# Patient Record
Sex: FEMALE | Race: WHITE | NOT HISPANIC OR LATINO | Employment: FULL TIME | URBAN - METROPOLITAN AREA
[De-identification: names, ages, dates, MRNs, and addresses within clinical notes are randomized per-mention and may not be internally consistent; named-entity substitution may affect disease eponyms.]

---

## 2023-04-07 LAB
ESTIMATED AVERAGE GLUCOSE FOR HBA1C: 108 MG/DL
HEMOGLOBIN A1C/HEMOGLOBIN TOTAL IN BLOOD: 5.4 %
THYROPEROXIDASE AB (IU/ML) IN SER/PLAS: <28 IU/ML
THYROTROPIN (MIU/L) IN SER/PLAS BY DETECTION LIMIT <= 0.05 MIU/L: 1.63 MIU/L (ref 0.44–3.98)
THYROXINE (T4) FREE (NG/DL) IN SER/PLAS: 1.01 NG/DL (ref 0.78–1.48)

## 2023-04-11 LAB — CORTISOL (UG/DL) IN SERUM - AM: 0.7 UG/DL (ref 5–20)

## 2023-04-14 LAB — ADRENOCORTICOTROPIC HORMONE: 3.4 PG/ML (ref 7.2–63.3)

## 2023-04-20 LAB — DEXAMETHASONE: 314 NG/DL

## 2023-10-02 ENCOUNTER — ANCILLARY PROCEDURE (OUTPATIENT)
Dept: RADIOLOGY | Facility: CLINIC | Age: 38
End: 2023-10-02
Payer: COMMERCIAL

## 2023-10-02 DIAGNOSIS — E04.1 NONTOXIC SINGLE THYROID NODULE: ICD-10-CM

## 2023-10-02 PROCEDURE — 76536 US EXAM OF HEAD AND NECK: CPT

## 2023-10-02 PROCEDURE — 76536 US EXAM OF HEAD AND NECK: CPT | Performed by: STUDENT IN AN ORGANIZED HEALTH CARE EDUCATION/TRAINING PROGRAM

## 2023-10-09 ENCOUNTER — APPOINTMENT (OUTPATIENT)
Dept: PRIMARY CARE | Facility: CLINIC | Age: 38
End: 2023-10-09

## 2023-10-09 RX ORDER — DEXAMETHASONE 1 MG/1
TABLET ORAL
COMMUNITY
Start: 2023-04-10 | End: 2023-10-10 | Stop reason: ALTCHOICE

## 2023-10-09 RX ORDER — NORETHINDRONE ACETATE AND ETHINYL ESTRADIOL AND FERROUS FUMARATE 1MG-20(21)
1 KIT ORAL DAILY
COMMUNITY
Start: 2023-02-02 | End: 2023-10-10 | Stop reason: ALTCHOICE

## 2023-10-09 RX ORDER — METFORMIN HYDROCHLORIDE 500 MG/1
TABLET ORAL
COMMUNITY
End: 2023-11-03 | Stop reason: SINTOL

## 2023-10-09 RX ORDER — LABETALOL 100 MG/1
TABLET, FILM COATED ORAL
COMMUNITY
End: 2023-10-10 | Stop reason: SDUPTHER

## 2023-10-09 RX ORDER — SERTRALINE HYDROCHLORIDE 100 MG/1
100 TABLET, FILM COATED ORAL DAILY
COMMUNITY
End: 2023-10-10 | Stop reason: ALTCHOICE

## 2023-10-09 RX ORDER — IBUPROFEN 600 MG/1
TABLET ORAL
COMMUNITY
End: 2023-10-10 | Stop reason: ALTCHOICE

## 2023-10-09 RX ORDER — LISINOPRIL 5 MG/1
TABLET ORAL EVERY 24 HOURS
COMMUNITY
End: 2023-10-10 | Stop reason: SDUPTHER

## 2023-10-09 RX ORDER — ASPIRIN 325 MG
1 TABLET, DELAYED RELEASE (ENTERIC COATED) ORAL WEEKLY
COMMUNITY
Start: 2017-05-15 | End: 2023-10-10 | Stop reason: ALTCHOICE

## 2023-10-09 RX ORDER — METFORMIN HYDROCHLORIDE 1000 MG/1
1000 TABLET ORAL
COMMUNITY
End: 2023-10-10 | Stop reason: ALTCHOICE

## 2023-10-09 RX ORDER — OMEPRAZOLE 20 MG/1
TABLET, DELAYED RELEASE ORAL
COMMUNITY

## 2023-10-09 RX ORDER — FAMOTIDINE 20 MG/1
TABLET, FILM COATED ORAL EVERY 24 HOURS
COMMUNITY
Start: 2022-03-07 | End: 2023-10-10 | Stop reason: ALTCHOICE

## 2023-10-09 RX ORDER — LISINOPRIL 20 MG/1
TABLET ORAL EVERY 24 HOURS
COMMUNITY
End: 2023-10-10 | Stop reason: ALTCHOICE

## 2023-10-09 RX ORDER — LABETALOL 200 MG/1
1 TABLET, FILM COATED ORAL EVERY 12 HOURS
COMMUNITY
Start: 2019-12-04 | End: 2023-11-03 | Stop reason: ALTCHOICE

## 2023-10-09 RX ORDER — LORATADINE 10 MG/1
TABLET ORAL EVERY 24 HOURS
COMMUNITY
End: 2023-11-03 | Stop reason: ALTCHOICE

## 2023-10-10 ENCOUNTER — OFFICE VISIT (OUTPATIENT)
Dept: PRIMARY CARE | Facility: CLINIC | Age: 38
End: 2023-10-10
Payer: COMMERCIAL

## 2023-10-10 VITALS
HEIGHT: 64 IN | BODY MASS INDEX: 50.02 KG/M2 | TEMPERATURE: 97.4 F | SYSTOLIC BLOOD PRESSURE: 138 MMHG | WEIGHT: 293 LBS | DIASTOLIC BLOOD PRESSURE: 90 MMHG

## 2023-10-10 DIAGNOSIS — I10 ESSENTIAL HYPERTENSION: Primary | ICD-10-CM

## 2023-10-10 DIAGNOSIS — E88.819 INSULIN RESISTANCE: ICD-10-CM

## 2023-10-10 DIAGNOSIS — F41.9 ANXIETY: ICD-10-CM

## 2023-10-10 DIAGNOSIS — E26.1 SECONDARY HYPERALDOSTERONISM (MULTI): ICD-10-CM

## 2023-10-10 DIAGNOSIS — E66.01 MORBID (SEVERE) OBESITY DUE TO EXCESS CALORIES (MULTI): ICD-10-CM

## 2023-10-10 DIAGNOSIS — F32.5 MAJOR DEPRESSIVE DISORDER WITH SINGLE EPISODE, IN FULL REMISSION (CMS-HCC): ICD-10-CM

## 2023-10-10 PROBLEM — I73.00 RAYNAUDS SYNDROME: Status: ACTIVE | Noted: 2020-08-09

## 2023-10-10 PROBLEM — Z90.09 HISTORY OF LOBECTOMY OF THYROID: Status: ACTIVE | Noted: 2023-10-10

## 2023-10-10 PROBLEM — E55.9 VITAMIN D DEFICIENCY: Status: ACTIVE | Noted: 2023-10-10

## 2023-10-10 PROBLEM — K59.00 CONSTIPATION: Status: ACTIVE | Noted: 2023-10-10

## 2023-10-10 PROBLEM — R53.83 FATIGUE: Status: ACTIVE | Noted: 2020-08-07

## 2023-10-10 PROBLEM — R41.840 POOR CONCENTRATION: Status: ACTIVE | Noted: 2021-02-10

## 2023-10-10 PROBLEM — E89.0 POSTOPERATIVE HYPOTHYROIDISM: Status: ACTIVE | Noted: 2023-10-10

## 2023-10-10 PROBLEM — K21.00 GERD WITH ESOPHAGITIS: Status: ACTIVE | Noted: 2018-07-16

## 2023-10-10 PROBLEM — H93.13 BILATERAL TINNITUS: Status: ACTIVE | Noted: 2020-11-05

## 2023-10-10 PROBLEM — E04.1 THYROID NODULE: Status: ACTIVE | Noted: 2023-10-10

## 2023-10-10 PROBLEM — M21.852 HIP DYSPLASIA, ACQUIRED, LEFT: Status: ACTIVE | Noted: 2023-10-10

## 2023-10-10 PROBLEM — R40.0 DAYTIME SLEEPINESS: Status: ACTIVE | Noted: 2023-10-10

## 2023-10-10 PROBLEM — M54.12 CERVICAL RADICULOPATHY: Status: ACTIVE | Noted: 2021-02-17

## 2023-10-10 PROBLEM — G44.86 CERVICOGENIC HEADACHE: Status: ACTIVE | Noted: 2021-02-17

## 2023-10-10 PROBLEM — R76.0 ELEVATED ANTISTREPTOLYSIN O TITER: Status: ACTIVE | Noted: 2021-02-18

## 2023-10-10 PROBLEM — L70.9 ACNE: Status: ACTIVE | Noted: 2023-10-10

## 2023-10-10 PROCEDURE — 1036F TOBACCO NON-USER: CPT | Performed by: PHYSICIAN ASSISTANT

## 2023-10-10 PROCEDURE — 99204 OFFICE O/P NEW MOD 45 MIN: CPT | Performed by: PHYSICIAN ASSISTANT

## 2023-10-10 PROCEDURE — 3080F DIAST BP >= 90 MM HG: CPT | Performed by: PHYSICIAN ASSISTANT

## 2023-10-10 PROCEDURE — 90471 IMMUNIZATION ADMIN: CPT | Performed by: PHYSICIAN ASSISTANT

## 2023-10-10 PROCEDURE — 3008F BODY MASS INDEX DOCD: CPT | Performed by: PHYSICIAN ASSISTANT

## 2023-10-10 PROCEDURE — 90682 RIV4 VACC RECOMBINANT DNA IM: CPT | Performed by: PHYSICIAN ASSISTANT

## 2023-10-10 PROCEDURE — 3075F SYST BP GE 130 - 139MM HG: CPT | Performed by: PHYSICIAN ASSISTANT

## 2023-10-10 RX ORDER — SERTRALINE HYDROCHLORIDE 50 MG/1
50 TABLET, FILM COATED ORAL DAILY
Qty: 30 TABLET | Refills: 3 | Status: SHIPPED | OUTPATIENT
Start: 2023-10-10 | End: 2024-03-04

## 2023-10-10 RX ORDER — LISINOPRIL 5 MG/1
5 TABLET ORAL DAILY
Qty: 30 TABLET | Refills: 3 | Status: SHIPPED | OUTPATIENT
Start: 2023-10-10 | End: 2023-10-30

## 2023-10-10 RX ORDER — LABETALOL 100 MG/1
TABLET, FILM COATED ORAL
Qty: 45 TABLET | Refills: 3 | Status: SHIPPED | OUTPATIENT
Start: 2023-10-10 | End: 2023-11-03 | Stop reason: ALTCHOICE

## 2023-10-10 ASSESSMENT — COLUMBIA-SUICIDE SEVERITY RATING SCALE - C-SSRS
6. HAVE YOU EVER DONE ANYTHING, STARTED TO DO ANYTHING, OR PREPARED TO DO ANYTHING TO END YOUR LIFE?: NO
1. IN THE PAST MONTH, HAVE YOU WISHED YOU WERE DEAD OR WISHED YOU COULD GO TO SLEEP AND NOT WAKE UP?: NO
6. HAVE YOU EVER DONE ANYTHING, STARTED TO DO ANYTHING, OR PREPARED TO DO ANYTHING TO END YOUR LIFE?: NO
1. IN THE PAST MONTH, HAVE YOU WISHED YOU WERE DEAD OR WISHED YOU COULD GO TO SLEEP AND NOT WAKE UP?: NO
2. HAVE YOU ACTUALLY HAD ANY THOUGHTS OF KILLING YOURSELF?: NO
2. HAVE YOU ACTUALLY HAD ANY THOUGHTS OF KILLING YOURSELF?: NO

## 2023-10-10 ASSESSMENT — PATIENT HEALTH QUESTIONNAIRE - PHQ9
SUM OF ALL RESPONSES TO PHQ9 QUESTIONS 1 AND 2: 2
SUM OF ALL RESPONSES TO PHQ9 QUESTIONS 1 AND 2: 2
1. LITTLE INTEREST OR PLEASURE IN DOING THINGS: SEVERAL DAYS
2. FEELING DOWN, DEPRESSED OR HOPELESS: SEVERAL DAYS
10. IF YOU CHECKED OFF ANY PROBLEMS, HOW DIFFICULT HAVE THESE PROBLEMS MADE IT FOR YOU TO DO YOUR WORK, TAKE CARE OF THINGS AT HOME, OR GET ALONG WITH OTHER PEOPLE: SOMEWHAT DIFFICULT
1. LITTLE INTEREST OR PLEASURE IN DOING THINGS: SEVERAL DAYS
10. IF YOU CHECKED OFF ANY PROBLEMS, HOW DIFFICULT HAVE THESE PROBLEMS MADE IT FOR YOU TO DO YOUR WORK, TAKE CARE OF THINGS AT HOME, OR GET ALONG WITH OTHER PEOPLE: SOMEWHAT DIFFICULT
2. FEELING DOWN, DEPRESSED OR HOPELESS: SEVERAL DAYS

## 2023-10-10 ASSESSMENT — ENCOUNTER SYMPTOMS
CHOKING: 0
SLEEP DISTURBANCE: 0
ABDOMINAL PAIN: 0
POLYDIPSIA: 0
FATIGUE: 0
JOINT SWELLING: 0
DIFFICULTY URINATING: 0
ANAL BLEEDING: 0
PALPITATIONS: 0
FEVER: 0
SHORTNESS OF BREATH: 0
WEAKNESS: 0
NUMBNESS: 0
WHEEZING: 0
POLYPHAGIA: 0
EYE DISCHARGE: 0
CHILLS: 0
FREQUENCY: 0
COLOR CHANGE: 0
HEADACHES: 0
CONSTIPATION: 0
ABDOMINAL DISTENTION: 0
TREMORS: 0
OCCASIONAL FEELINGS OF UNSTEADINESS: 0
CHEST TIGHTNESS: 0
NERVOUS/ANXIOUS: 0
DIZZINESS: 0
ARTHRALGIAS: 0
VOMITING: 0
DIARRHEA: 0
APPETITE CHANGE: 0
LOSS OF SENSATION IN FEET: 0
COUGH: 0
NAUSEA: 0
CONFUSION: 0
EYE PAIN: 0
MYALGIAS: 0
HEMATURIA: 0
SORE THROAT: 0
DEPRESSION: 1
FACIAL SWELLING: 0

## 2023-10-10 NOTE — PROGRESS NOTES
Subjective   Patient ID: Tamra Tai is a 37 y.o. female with a history of obesity, JENNY, hypertension, thyroid nodule, chronic fatigue, Raynaud's phenomenon, diabetes type 2, tinnitus B/L, thyroid nodule s/p hemithyroidectomy in 2016, GERD, depression, and vitamin D deficiency who presents for Establish Care and Flu Vaccine.    HPI the patient is presented to become established as a new patient with a primary care provider.  Stated she is trying to taper her labetalol which was given during pregnancy 2 years ago.  She used to be on lisinopril.  She is also tapering sertraline stating that she felt like zombie and since she is on 75 mg she feels much better.  Stated her personality is back.  It is easier to talk to people and feeling like herself.  The patient has hard time losing weight.  She gained 60 pounds in 2 years.  States she started gaining weight after she was started on sertraline. She currently goes to endocrinology and was started on metformin for insulin resistance; however, she is not losing any weight.  She is also on weight watchers for a year without any improvement.  She still feels tired even with CPAP which she got a few weeks ago.      Review of Systems   Constitutional:  Negative for appetite change, chills, fatigue and fever.   HENT:  Negative for congestion, ear pain, facial swelling, hearing loss, nosebleeds and sore throat.    Eyes:  Negative for pain, discharge and visual disturbance.   Respiratory:  Negative for cough, choking, chest tightness, shortness of breath and wheezing.    Cardiovascular:  Negative for chest pain, palpitations and leg swelling.   Gastrointestinal:  Negative for abdominal distention, abdominal pain, anal bleeding, constipation, diarrhea, nausea and vomiting.   Endocrine: Negative for cold intolerance, heat intolerance, polydipsia, polyphagia and polyuria.   Genitourinary:  Negative for difficulty urinating, frequency, hematuria and urgency.  "  Musculoskeletal:  Negative for arthralgias, gait problem, joint swelling and myalgias.   Skin:  Negative for color change and rash.   Neurological:  Negative for dizziness, tremors, syncope, weakness, numbness and headaches.   Psychiatric/Behavioral:  Negative for behavioral problems, confusion, sleep disturbance and suicidal ideas. The patient is not nervous/anxious.        Objective   /90   Temp 36.3 °C (97.4 °F)   Ht 1.626 m (5' 4\")   Wt 133 kg (293 lb)   LMP 09/17/2023   Breastfeeding No   BMI 50.29 kg/m²     Physical Exam  Constitutional:       General: She is not in acute distress.     Appearance: Normal appearance.   HENT:      Head: Normocephalic and atraumatic.      Nose: Nose normal.   Eyes:      Extraocular Movements: Extraocular movements intact.      Conjunctiva/sclera: Conjunctivae normal.      Pupils: Pupils are equal, round, and reactive to light.   Cardiovascular:      Rate and Rhythm: Normal rate and regular rhythm.      Pulses: Normal pulses.      Heart sounds: Normal heart sounds.   Pulmonary:      Effort: Pulmonary effort is normal.      Breath sounds: Normal breath sounds.   Abdominal:      General: Bowel sounds are normal.      Palpations: Abdomen is soft.   Musculoskeletal:         General: Normal range of motion.      Cervical back: Normal range of motion and neck supple.   Neurological:      General: No focal deficit present.      Mental Status: She is alert and oriented to person, place, and time.   Psychiatric:         Mood and Affect: Mood normal.         Behavior: Behavior normal.         Thought Content: Thought content normal.         Judgment: Judgment normal.         Assessment/Plan       BMI 50.29 kg/m²  Gained 60 pounds in 2 years  Currently on weight watchers since July 2022  On metformin 500 mg twice daily  Unable to lose weight  Could be due to sertraline  Currently tapering  Continue metformin 500 mg twice daily  Low fat, low cholesterol diet, low carbohydrate " diet  Exercise at least 30 minutes daily    High blood pressure   Tapering labetalol  She is currently on labetalol 200 mg a.m. and 100 mg p.m.  We will taper to 100 mg in the morning and 50 mg in the evening  Start lisinopril 5 mg once daily  Monitor blood pressure daily  If above 140 / 90 mmHg, we will increase lisinopril  No added salt diet, do not add salt to your food  Try to exercise every day for 30 minutes    Insulin resistance  Continue Metformin 500 mg twice daily  Follow-up with endocrinology Dr. Aguilar     Depression  Stable  Tapering sertraline 100 mg   Currently on 75 mg  Decrease to 50 mg if tolerable  If not, go back to 75 mg for another couple of weeks  Doing better    Acid reflux  Avoid caffeine and alcoholic beverages  Avoid spicy and acidic food, such as tomato and citrus fruits  Avoid onions, peppermint and spearmint   Eat small, frequent meals  Do not eat late at night, at least 3 hours before bed  Raise the head of the bed 6-8 inches for sleeping  Wear lose-fitting clothes around your waist   Continue Omeprazole 20 mg before meal     JENNY  Home sleep study June 2023  On CPAP  Follow-up with sleep medicine    Vaccine given today    Follow-up in 2 weeks

## 2023-10-25 DIAGNOSIS — F41.9 ANXIETY: Primary | ICD-10-CM

## 2023-10-26 PROBLEM — M25.859 FEMORAL ACETABULAR IMPINGEMENT: Status: ACTIVE | Noted: 2023-10-26

## 2023-10-26 PROBLEM — R63.5 ABNORMAL WEIGHT GAIN: Status: ACTIVE | Noted: 2023-10-26

## 2023-10-26 PROBLEM — G47.33 OBSTRUCTIVE SLEEP APNEA OF ADULT: Status: ACTIVE | Noted: 2023-10-26

## 2023-10-26 RX ORDER — SERTRALINE HYDROCHLORIDE 100 MG/1
100 TABLET, FILM COATED ORAL DAILY
Qty: 90 TABLET | Refills: 1 | Status: SHIPPED | OUTPATIENT
Start: 2023-10-26 | End: 2023-11-03 | Stop reason: ALTCHOICE

## 2023-10-27 ENCOUNTER — OFFICE VISIT (OUTPATIENT)
Dept: OPHTHALMOLOGY | Facility: CLINIC | Age: 38
End: 2023-10-27
Payer: COMMERCIAL

## 2023-10-27 DIAGNOSIS — H52.203 MYOPIA OF BOTH EYES WITH ASTIGMATISM: Primary | ICD-10-CM

## 2023-10-27 DIAGNOSIS — H52.13 MYOPIA OF BOTH EYES WITH ASTIGMATISM: Primary | ICD-10-CM

## 2023-10-27 DIAGNOSIS — Z46.0 CONTACT LENS/GLASSES FITTING: ICD-10-CM

## 2023-10-27 PROCEDURE — FLVLF CONTACT LENS EVALUATION (SP): Performed by: OPTOMETRIST

## 2023-10-27 PROCEDURE — 92015 DETERMINE REFRACTIVE STATE: CPT | Performed by: OPTOMETRIST

## 2023-10-27 PROCEDURE — 92004 COMPRE OPH EXAM NEW PT 1/>: CPT | Performed by: OPTOMETRIST

## 2023-10-27 ASSESSMENT — CONF VISUAL FIELD
OS_SUPERIOR_NASAL_RESTRICTION: 0
OD_SUPERIOR_NASAL_RESTRICTION: 0
OS_SUPERIOR_TEMPORAL_RESTRICTION: 0
OD_NORMAL: 1
OS_NORMAL: 1
OD_SUPERIOR_TEMPORAL_RESTRICTION: 0
METHOD: COUNTING FINGERS
OD_INFERIOR_TEMPORAL_RESTRICTION: 0
OD_INFERIOR_NASAL_RESTRICTION: 0
OS_INFERIOR_NASAL_RESTRICTION: 0
OS_INFERIOR_TEMPORAL_RESTRICTION: 0

## 2023-10-27 ASSESSMENT — REFRACTION_WEARINGRX
OD_CYLINDER: -1.50
OS_SPHERE: -1.50
OD_AXIS: 085
OD_SPHERE: -1.50
OS_AXIS: 090
OS_CYLINDER: -1.75

## 2023-10-27 ASSESSMENT — KERATOMETRY
OS_AXISANGLE2_DEGREES: 171
OD_AXISANGLE2_DEGREES: 143
OS_K1POWER_DIOPTERS: 46.50
OS_K2POWER_DIOPTERS: 45.50
OS_AXISANGLE_DEGREES: 081
OD_AXISANGLE_DEGREES: 053
OD_K1POWER_DIOPTERS: 46.00
OD_K2POWER_DIOPTERS: 46.75

## 2023-10-27 ASSESSMENT — ENCOUNTER SYMPTOMS
EYES NEGATIVE: 0
GASTROINTESTINAL NEGATIVE: 0
ALLERGIC/IMMUNOLOGIC NEGATIVE: 0
CARDIOVASCULAR NEGATIVE: 0
PSYCHIATRIC NEGATIVE: 0
NEUROLOGICAL NEGATIVE: 0
HEMATOLOGIC/LYMPHATIC NEGATIVE: 0
MUSCULOSKELETAL NEGATIVE: 0
CONSTITUTIONAL NEGATIVE: 0
ENDOCRINE NEGATIVE: 0
RESPIRATORY NEGATIVE: 0

## 2023-10-27 ASSESSMENT — EXTERNAL EXAM - LEFT EYE: OS_EXAM: NORMAL

## 2023-10-27 ASSESSMENT — REFRACTION_MANIFEST
OS_CYLINDER: -1.75
OS_CYLINDER: -2.00
OD_SPHERE: -2.75
OD_SPHERE: -2.00
OD_CYLINDER: -1.25
OS_SPHERE: -1.75
OS_AXIS: 083
OS_AXIS: 090
OD_CYLINDER: -0.50
METHOD_AUTOREFRACTION: 1
OD_AXIS: 095
OS_SPHERE: -1.50
OD_AXIS: 119

## 2023-10-27 ASSESSMENT — SLIT LAMP EXAM - LIDS
COMMENTS: NORMAL
COMMENTS: NORMAL

## 2023-10-27 ASSESSMENT — VISUAL ACUITY
OS_CC: 20/20
METHOD: SNELLEN - LINEAR
CORRECTION_TYPE: GLASSES
OS_CC: 20/20
OS_CC+: -1
OD_CC: 20/25
VA_OR_OD_CURRENT_RX: 20/20
VA_OR_OS_CURRENT_RX: 20/20
OD_CC: 20/20

## 2023-10-27 ASSESSMENT — REFRACTION_CURRENTRX
OD_BASECURVE: 8.5
OS_BASECURVE: 8.5
OS_DIAMETER: 14.5
OD_AXIS: 090
OS_BRAND: ACUVUE 1 DAY MOIST FOR ASTIGMATISM
OD_BRAND: ACUVUE 1 DAY MOIST FOR ASTIGMATISM
OS_AXIS: 090
OD_DIAMETER: 14.5
OD_CYLINDER: -1.25
OD_SPHERE: -2.00
OS_SPHERE: -1.00
OS_CYLINDER: -1.75

## 2023-10-27 ASSESSMENT — EXTERNAL EXAM - RIGHT EYE: OD_EXAM: NORMAL

## 2023-10-27 ASSESSMENT — TONOMETRY
OD_IOP_MMHG: 18
IOP_METHOD: GOLDMANN APPLANATION
OS_IOP_MMHG: 18

## 2023-10-27 NOTE — PROGRESS NOTES
Assessment/Plan   Diagnoses and all orders for this visit:  Myopia of both eyes with astigmatism  Patient educated on findings. Updated spectacle Rx dispensed today. Monitor yearly.   Contact lens/glasses fitting  Previous wearer of monthly contacts. Successful fit in daily lenses today. Trials sent home with patient. Patient educated on contact lens hygiene and daily replacement schedule. Patient instructed to RTC immediately if experiencing redness, pain, or blurry vision in contacts. Patient will call to finalize prescription. Monitor yearly.    First exam, optic nerves appear slightly elevated although this could be physiologic vs drusen vs early papilledema. Visual acuity (VA) is excellent, although patient does have a history of headaches which have worsened in the past few years. Had an MRI 6 years ago which was non-contributory per patient's recollection.    Refer to Dr Mckenzie for for possible optic nerve swelling and IIH consult.

## 2023-10-30 DIAGNOSIS — I10 ESSENTIAL HYPERTENSION: Primary | ICD-10-CM

## 2023-10-30 RX ORDER — LISINOPRIL 10 MG/1
10 TABLET ORAL DAILY
Qty: 30 TABLET | Refills: 5 | Status: SHIPPED | OUTPATIENT
Start: 2023-10-30 | End: 2023-11-13

## 2023-11-03 ENCOUNTER — DOCUMENTATION (OUTPATIENT)
Dept: OPHTHALMOLOGY | Facility: CLINIC | Age: 38
End: 2023-11-03

## 2023-11-03 ENCOUNTER — OFFICE VISIT (OUTPATIENT)
Dept: ENDOCRINOLOGY | Facility: CLINIC | Age: 38
End: 2023-11-03
Payer: COMMERCIAL

## 2023-11-03 VITALS
SYSTOLIC BLOOD PRESSURE: 136 MMHG | DIASTOLIC BLOOD PRESSURE: 93 MMHG | BODY MASS INDEX: 49.83 KG/M2 | HEIGHT: 64 IN | HEART RATE: 96 BPM | WEIGHT: 291.9 LBS

## 2023-11-03 DIAGNOSIS — R63.5 ABNORMAL WEIGHT GAIN: ICD-10-CM

## 2023-11-03 DIAGNOSIS — H52.13 MYOPIA OF BOTH EYES WITH ASTIGMATISM: Primary | ICD-10-CM

## 2023-11-03 DIAGNOSIS — E04.1 THYROID NODULE: ICD-10-CM

## 2023-11-03 DIAGNOSIS — Z90.09 HISTORY OF LOBECTOMY OF THYROID: ICD-10-CM

## 2023-11-03 DIAGNOSIS — E88.819 INSULIN RESISTANCE: Primary | ICD-10-CM

## 2023-11-03 DIAGNOSIS — H52.203 MYOPIA OF BOTH EYES WITH ASTIGMATISM: Primary | ICD-10-CM

## 2023-11-03 PROCEDURE — 3080F DIAST BP >= 90 MM HG: CPT | Performed by: INTERNAL MEDICINE

## 2023-11-03 PROCEDURE — 3075F SYST BP GE 130 - 139MM HG: CPT | Performed by: INTERNAL MEDICINE

## 2023-11-03 PROCEDURE — 3008F BODY MASS INDEX DOCD: CPT | Performed by: INTERNAL MEDICINE

## 2023-11-03 PROCEDURE — 1036F TOBACCO NON-USER: CPT | Performed by: INTERNAL MEDICINE

## 2023-11-03 PROCEDURE — 99215 OFFICE O/P EST HI 40 MIN: CPT | Performed by: INTERNAL MEDICINE

## 2023-11-03 RX ORDER — ACETAMINOPHEN 160 MG
1 TABLET,CHEWABLE ORAL AS NEEDED
COMMUNITY

## 2023-11-03 RX ORDER — METFORMIN HYDROCHLORIDE 500 MG/1
1000 TABLET, EXTENDED RELEASE ORAL
Qty: 120 TABLET | Refills: 11 | Status: SHIPPED | OUTPATIENT
Start: 2023-11-03 | End: 2024-03-11 | Stop reason: WASHOUT

## 2023-11-03 ASSESSMENT — REFRACTION_CURRENTRX
OD_BRAND: ACUVUE 1 DAY MOIST FOR ASTIGMATISM
OS_AXIS: 090
OD_AXIS: 090
OD_SPHERE: -2.00
OD_DIAMETER: 14.5
OS_BASECURVE: 8.5
OS_CYLINDER: -1.75
OS_SPHERE: -1.50
OD_CYLINDER: -1.25
OD_BASECURVE: 8.5
OS_DIAMETER: 14.5
OS_BRAND: ACUVUE 1 DAY MOIST FOR ASTIGMATISM

## 2023-11-03 NOTE — PROGRESS NOTES
"Subjective   Tamra Tai is a 38 y.o. female who presents to endocrinology clinic for follow-up of thyroid nodules s/p L hemithyroidectomy, and fatigue & weight gain.  Last visit: 6/2/23    ENDOCRINE Hx:  s/p left hemithyroidectomy for thyroid nodule after two indeterminate biopsies in June 2016 at [facility] (Dr. Thomson). Pathology reportedly benign. Seen by me in April 2023 for recent rapid weight gain (40-50lbs), fatigue, suspected JENNY, BMI 48. TFTs/TPO & A1c wnl. 1mg DST normal as well.   US thyroid Oct 2022:  RIGHT LOBE: 5.8 x 1.9 x 1.9 cm, similar in size compared to the previous study. Mild heterogeneous appearance of the thyroid parenchyma on the right. 4 x 6 x 6 mm hypoechoic solid well-defined wider than tall nodule without microcalcifications (TR4)  LEFT LOBE: Status post left lobectomy.    INTERVAL Hx:   Diagnosed with JENNY, using CPAP every night but still feels like she wants to sleep all the time, still very tired. Toddler is sleeping through the night. Sensitive to the heat, sweats easily. Has gained about 12 lbs since summer.  Constipated.  Losing a lot of hair.      Started on metformin in April 2023, dose increased in June 2023 but did not tolerate due to diarrhea, so went back down to 500mg BID.    Exercise - weighted hulu hoop, walking  Periods still regular q24 days and heavy x10 days  Diet - doing weight watchers     Repeat thyroid US done a month ago, Oct 2023:  TR4 nodule [id number], stable  TR3 nodule [id number]  TR3 nodule [id number]     PAST MEDICAL HISTORY:  Hypertension  Postpartum depression  s/p cholecystectomy 2019  s/p L irma thyroidectomy 2016  vitamin D deficiency     SOCIAL HISTORY:  Lives with 2 sons, age 13 and 2  Nurse, works for [employer], from home, reviewing cardiac caths    ROS: otherwise negative.    Objective   BP (!) 136/93 (BP Location: Right arm, Patient Position: Sitting, BP Cuff Size: Adult)   Pulse 96   Ht 1.626 m (5' 4\")   Wt 132 kg (291 lb 14.4 oz)   LMP 09/17/2023   " BMI 50.10 kg/m²     Physical Exam  alert and conversant, in no acute distress  no lid lag, no proptosis  thyroidectomy scar well healed, R lobe normal size & consistency without discrete nodule  normal work of breathing  normal DTRs  Skin warm, normal moisture; no significant acanthosis, hirsutism, or acne   Normal mood/affect       Lab Results   Component Value Date    TSH 1.63 04/07/2023    TSH 1.57 06/16/2022    FREET4 1.01 04/07/2023    LDLF 130 (H) 12/05/2019    TRIG 112 06/16/2022    HGBA1C 5.4 04/07/2023    ALT 25 06/16/2022    AST 17 06/16/2022    TESTOSTERONE <30 12/05/2019   ,  in 6/2022    Assessment/Plan   Insulin resistance & abnormal weight gain, BMI up to 50  -     metFORMIN  mg 24 hr tablet; Take 2 tablets (1,000 mg) by mouth 2 times a day with meals. Do not crush, chew, or split.  -      lifestyle recs reviewed    History of lobectomy of thyroid, thyroid nodules all subcm TR3-4  -     TFTs with next routine labs  -     thyroid US in a year     FUV 6 mo or sooner PRN

## 2023-11-03 NOTE — PROGRESS NOTES
Diagnoses and all orders for this visit:  Myopia of both eyes with astigmatism    Patient called and said left eye was blurry with the contact lens (CL) she left with, we have increased the power of the left eye (OS) contact lens (CL) and have trials at the [facility] office and patient can come in a  those trials.  Office will call patient and inform her.

## 2023-11-03 NOTE — PATIENT INSTRUCTIONS
Great to see you!  Will check thyroid levels with your next routine blood work  We'll plan to repeat the thyroid ultrasound in a year  We're try increasing the metformin again but with the extended release formulation.  Keep trying to exercise most days and minimizing processed foods.  See you back in 6 months!

## 2023-11-07 NOTE — PROGRESS NOTES
Patient called and said left eye was blurry with the contact lens (CL) she left with, we have increased the power of the left eye (OS) contact lens (CL) and have trials at the [facility] office and patient can come in a  those trials.  Office will call patient and inform her.

## 2023-11-11 ASSESSMENT — ENCOUNTER SYMPTOMS
POLYDIPSIA: 0
APPETITE CHANGE: 0
COUGH: 0
NUMBNESS: 0
JOINT SWELLING: 0
SORE THROAT: 0
SLEEP DISTURBANCE: 0
EYE DISCHARGE: 0
ABDOMINAL DISTENTION: 0
HEMATURIA: 0
NAUSEA: 0
POLYPHAGIA: 0
NERVOUS/ANXIOUS: 0
FATIGUE: 0
WHEEZING: 0
EYE PAIN: 0
PALPITATIONS: 0
DIZZINESS: 0
FACIAL SWELLING: 0
ARTHRALGIAS: 0
CHILLS: 0
CONFUSION: 0
CHEST TIGHTNESS: 0
FREQUENCY: 0
FEVER: 0
CONSTIPATION: 0
VOMITING: 0
MYALGIAS: 0
CHOKING: 0
SHORTNESS OF BREATH: 0
DIFFICULTY URINATING: 0
TREMORS: 0
COLOR CHANGE: 0
ANAL BLEEDING: 0
ABDOMINAL PAIN: 0
WEAKNESS: 0
DIARRHEA: 0
HEADACHES: 0

## 2023-11-11 NOTE — PROGRESS NOTES
Subjective   Patient ID: Tamra Tai is a 38 y.o. female with a history of obesity, JENNY, hypertension, thyroid nodule, chronic fatigue, Raynaud's phenomenon, diabetes type 2, chronic constipation, tinnitus B/L, thyroid nodule s/p hemithyroidectomy in 2016, GERD, depression, and vitamin D deficiency who presents for Annual Exam.    HPI the patient is presented for physical exam.  She tapered her labetalol and increased lisinopril to 30 mg 2 days ago which controls her blood pressure better.  She was seen by ophthalmology and was suspected to have intracranial pressure.  She was referred to neuro-ophthalmology which is scheduled on December 1.  She tapered sertraline to 50 mg and her mood is stable.  The patient does not exercise but stays active with her kids.  She is on weight watchers diet.   Today she denies shortness of breath of breast or chest pain.  There is no abdominal pain, nausea or vomiting.  No fever or chills.    Review of Systems   Constitutional:  Negative for appetite change, chills, fatigue and fever.   HENT:  Negative for congestion, ear pain, facial swelling, hearing loss, nosebleeds and sore throat.    Eyes:  Negative for pain, discharge and visual disturbance.   Respiratory:  Negative for cough, choking, chest tightness, shortness of breath and wheezing.    Cardiovascular:  Negative for chest pain, palpitations and leg swelling.   Gastrointestinal:  Negative for abdominal distention, abdominal pain, anal bleeding, constipation, diarrhea, nausea and vomiting.   Endocrine: Negative for cold intolerance, heat intolerance, polydipsia, polyphagia and polyuria.   Genitourinary:  Negative for difficulty urinating, frequency, hematuria and urgency.   Musculoskeletal:  Negative for arthralgias, gait problem, joint swelling and myalgias.   Skin:  Negative for color change and rash.   Neurological:  Negative for dizziness, tremors, syncope, weakness, numbness and headaches.   Psychiatric/Behavioral:  " Negative for behavioral problems, confusion, sleep disturbance and suicidal ideas. The patient is not nervous/anxious.        Objective   BP (!) 132/94   Temp 36.2 °C (97.1 °F) (Temporal)   Ht 1.626 m (5' 4\")   Wt 132 kg (291 lb)   BMI 49.95 kg/m²     Physical Exam  Constitutional:       General: She is not in acute distress.     Appearance: Normal appearance. She is obese.   HENT:      Head: Normocephalic and atraumatic.      Nose: Nose normal.   Eyes:      Extraocular Movements: Extraocular movements intact.      Conjunctiva/sclera: Conjunctivae normal.      Pupils: Pupils are equal, round, and reactive to light.   Cardiovascular:      Rate and Rhythm: Normal rate and regular rhythm.      Pulses: Normal pulses.      Heart sounds: Normal heart sounds.   Pulmonary:      Effort: Pulmonary effort is normal.      Breath sounds: Normal breath sounds.   Abdominal:      General: Bowel sounds are normal.      Palpations: Abdomen is soft.   Musculoskeletal:         General: Normal range of motion.      Cervical back: Normal range of motion and neck supple.   Neurological:      General: No focal deficit present.      Mental Status: She is alert and oriented to person, place, and time.   Psychiatric:         Mood and Affect: Mood normal.         Behavior: Behavior normal.         Thought Content: Thought content normal.         Judgment: Judgment normal.         Assessment/Plan   Complete physical exam.  We obtained fasting blood work including CBC, CMP, lipid panel.  Urinalysis    Exercise at least 30 minutes daily  Healthy diet recommended    Follow up with dentist twice a year for dental cleaning   Follow-up with ophthalmology in  pap smear overdue  Follow-up with gynecology    BMI 49.93 kg/m²  Gained 60 pounds in 2 years  Currently on weight watchers since July 2022  On metformin  mg twice daily  Low fat, low cholesterol diet, low carbohydrate diet  Exercise at least 30 minutes daily    High blood pressure "   Off of labetalol  Increased lisinopril to 30 mg 2 days ago  Continue lisinopril 30 mg and continue monitoring blood pressure  If elevated will increase to 40 mg.   No added salt diet, do not add salt to your food  Try to exercise every day for 30 minutes    Insulin resistance  Continue Metformin  mg twice daily  Follow-up with endocrinology Dr. Aguilar     Possible intracranial pressure.   neuro-ophthalmology scheduled on December 1    Depression  Stable  Doing well on sertraline 50 mg    Acid reflux  Avoid caffeine and alcoholic beverages  Avoid spicy and acidic food, such as tomato and citrus fruits  Avoid onions, peppermint and spearmint   Eat small, frequent meals  Do not eat late at night, at least 3 hours before bed  Raise the head of the bed 6-8 inches for sleeping  Wear lose-fitting clothes around your waist   Continue Omeprazole 20 mg before meal     Chronic constipation  Fiber diet  Drink plenty of fluids    JENNY  Home sleep study June 2023  On CPAP  Follow-up with sleep medicine    Follow-up in 1 month

## 2023-11-13 ENCOUNTER — LAB (OUTPATIENT)
Dept: LAB | Facility: LAB | Age: 38
End: 2023-11-13
Payer: COMMERCIAL

## 2023-11-13 ENCOUNTER — OFFICE VISIT (OUTPATIENT)
Dept: PRIMARY CARE | Facility: CLINIC | Age: 38
End: 2023-11-13
Payer: COMMERCIAL

## 2023-11-13 VITALS
HEIGHT: 64 IN | SYSTOLIC BLOOD PRESSURE: 132 MMHG | BODY MASS INDEX: 49.68 KG/M2 | DIASTOLIC BLOOD PRESSURE: 94 MMHG | TEMPERATURE: 97.1 F | WEIGHT: 291 LBS

## 2023-11-13 DIAGNOSIS — E04.1 THYROID NODULE: ICD-10-CM

## 2023-11-13 DIAGNOSIS — Z00.00 ROUTINE ADULT HEALTH MAINTENANCE: Primary | ICD-10-CM

## 2023-11-13 DIAGNOSIS — F32.5 MAJOR DEPRESSIVE DISORDER WITH SINGLE EPISODE, IN FULL REMISSION (CMS-HCC): ICD-10-CM

## 2023-11-13 DIAGNOSIS — Z90.09 HISTORY OF LOBECTOMY OF THYROID: ICD-10-CM

## 2023-11-13 DIAGNOSIS — I10 ESSENTIAL HYPERTENSION: ICD-10-CM

## 2023-11-13 PROBLEM — E04.2 MULTIPLE THYROID NODULES: Status: ACTIVE | Noted: 2019-01-25

## 2023-11-13 LAB
APPEARANCE UR: CLEAR
BILIRUB UR QL STRIP: NEGATIVE
COLOR UR: YELLOW
GLUCOSE UR STRIP-MCNC: NEGATIVE MG/DL
HGB UR QL STRIP: NEGATIVE
KETONES UR STRIP-MCNC: NEGATIVE MG/DL
LEUKOCYTE ESTERASE UR QL STRIP: NEGATIVE
NITRITE UR QL STRIP: NEGATIVE
PH UR STRIP: 6 [PH]
PROT UR STRIP-MCNC: NEGATIVE MG/DL
SP GR UR STRIP.AUTO: >=1.03
T4 FREE SERPL-MCNC: 1 NG/DL (ref 0.78–1.48)
TSH SERPL-ACNC: 2.15 MIU/L (ref 0.44–3.98)
UROBILINOGEN UR STRIP-ACNC: 0.2 E.U./DL

## 2023-11-13 PROCEDURE — 80061 LIPID PANEL: CPT | Performed by: PHYSICIAN ASSISTANT

## 2023-11-13 PROCEDURE — 3075F SYST BP GE 130 - 139MM HG: CPT | Performed by: PHYSICIAN ASSISTANT

## 2023-11-13 PROCEDURE — 36415 COLL VENOUS BLD VENIPUNCTURE: CPT

## 2023-11-13 PROCEDURE — 84439 ASSAY OF FREE THYROXINE: CPT

## 2023-11-13 PROCEDURE — 3008F BODY MASS INDEX DOCD: CPT | Performed by: PHYSICIAN ASSISTANT

## 2023-11-13 PROCEDURE — 81003 URINALYSIS AUTO W/O SCOPE: CPT | Performed by: PHYSICIAN ASSISTANT

## 2023-11-13 PROCEDURE — 80053 COMPREHEN METABOLIC PANEL: CPT | Performed by: PHYSICIAN ASSISTANT

## 2023-11-13 PROCEDURE — 3080F DIAST BP >= 90 MM HG: CPT | Performed by: PHYSICIAN ASSISTANT

## 2023-11-13 PROCEDURE — 99395 PREV VISIT EST AGE 18-39: CPT | Performed by: PHYSICIAN ASSISTANT

## 2023-11-13 PROCEDURE — 84443 ASSAY THYROID STIM HORMONE: CPT

## 2023-11-13 PROCEDURE — 85025 COMPLETE CBC W/AUTO DIFF WBC: CPT | Performed by: PHYSICIAN ASSISTANT

## 2023-11-13 PROCEDURE — 1036F TOBACCO NON-USER: CPT | Performed by: PHYSICIAN ASSISTANT

## 2023-11-13 RX ORDER — LISINOPRIL 30 MG/1
30 TABLET ORAL DAILY
Qty: 30 TABLET | Refills: 6 | Status: SHIPPED | OUTPATIENT
Start: 2023-11-13 | End: 2024-11-12

## 2023-11-13 ASSESSMENT — PAIN SCALES - GENERAL: PAINLEVEL: 5

## 2023-11-13 ASSESSMENT — PROMIS GLOBAL HEALTH SCALE
RATE_AVERAGE_PAIN: 7
RATE_PHYSICAL_HEALTH: FAIR
EMOTIONAL_PROBLEMS: OFTEN
RATE_GENERAL_HEALTH: FAIR
CARRYOUT_SOCIAL_ACTIVITIES: FAIR
RATE_AVERAGE_FATIGUE: VERY SEVERE
RATE_SOCIAL_SATISFACTION: POOR
CARRYOUT_PHYSICAL_ACTIVITIES: COMPLETELY
RATE_QUALITY_OF_LIFE: FAIR
RATE_MENTAL_HEALTH: FAIR

## 2023-11-30 NOTE — PROGRESS NOTES
Assessment and Plan    03/01/2021 MRI brain without contrast, which I personally reviewed, shows     Lab Results   Component Value Date/Time    SEDRATE 1 08/10/2021 1437    SEDRATE 21 (H) 02/10/2021 1434    SEDRATE 5 09/19/2020 1152    CRP 0.3 08/10/2021 1437    CRP 0.7 02/10/2021 1434    CRP 0.3 09/19/2020 1152      Lab Results   Component Value Date/Time    CJDHZMYN49 565 08/10/2021 1437    PGLTAAZT52 421 02/10/2021 1434    [id number] 459 12/05/2019 0811      12/09/2020 RPR non-reactive (NR).    12/1/2023 OCT RNFL  & .    12/1/2023 HVF 24-2 OD fovea 39, scatter MD -0.47 & OS fovea 38, early nasal step versus rim defect MD -0.02.    This 38 year-old woman with a history of HTN, family HSV-2 Mollaret meningitis, thyroid lobectomy with hypothyroidism, insulin resistance, obesity, JENNY, acne, Raynaud syndrome presents for evaluation of elevated optic nerves.    She has elevated appearing optic nerves in the setting of headaches and weight gain. The findings are most suspicious for idiopathic intracranial hypertension. Alternatives include venous sinus thrombosis, intracranial mass and meningitic disease.    MRI brain with contrast & MRV head will be ordered to adjudicate among the possibilities. If there is no evidence of intracranial mass or venous sinus thrombosis, lumbar puncture with opening pressure, protein, glucose & cell count will follow. If the opening pressure is above 20 cm water, the diagnosis will be secured, and the patient should begin treatment with acetazolamide if not otherwise contraindicated.    Plan    MRI brain with contrast & MRV head  If no imaging contraindication, lumbar puncture with opening pressure, protein, glucose & cell count as well as HSV PCR.  If opening pressure greater than 20 cm water, start acetazolamide 500 mg PO BID. We discussed expected side effects.  Weight loss with Weight Management referral.    Follow up in 4-6 weeks with OCT & HVF or sooner if not improving.  (Dilated 12/1/2023)

## 2023-12-01 ENCOUNTER — OFFICE VISIT (OUTPATIENT)
Dept: OPHTHALMOLOGY | Facility: CLINIC | Age: 38
End: 2023-12-01
Payer: COMMERCIAL

## 2023-12-01 DIAGNOSIS — H47.10 OPTIC DISC EDEMA: Primary | ICD-10-CM

## 2023-12-01 PROCEDURE — 92133 CPTRZD OPH DX IMG PST SGM ON: CPT | Performed by: PSYCHIATRY & NEUROLOGY

## 2023-12-01 PROCEDURE — 99215 OFFICE O/P EST HI 40 MIN: CPT | Performed by: PSYCHIATRY & NEUROLOGY

## 2023-12-01 PROCEDURE — 92083 EXTENDED VISUAL FIELD XM: CPT | Performed by: PSYCHIATRY & NEUROLOGY

## 2023-12-01 ASSESSMENT — ENCOUNTER SYMPTOMS
RESPIRATORY NEGATIVE: 0
EYES NEGATIVE: 0
GASTROINTESTINAL NEGATIVE: 0
PSYCHIATRIC NEGATIVE: 0
CONSTITUTIONAL NEGATIVE: 0
ALLERGIC/IMMUNOLOGIC NEGATIVE: 0
NEUROLOGICAL NEGATIVE: 1
ENDOCRINE NEGATIVE: 0
CARDIOVASCULAR NEGATIVE: 0
MUSCULOSKELETAL NEGATIVE: 0
HEMATOLOGIC/LYMPHATIC NEGATIVE: 0

## 2023-12-01 ASSESSMENT — EXTERNAL EXAM - LEFT EYE: OS_EXAM: NORMAL

## 2023-12-01 ASSESSMENT — TONOMETRY
OS_IOP_MMHG: 20
IOP_METHOD: GOLDMANN APPLANATION
OD_IOP_MMHG: 22

## 2023-12-01 ASSESSMENT — EXTERNAL EXAM - RIGHT EYE: OD_EXAM: NORMAL

## 2023-12-01 ASSESSMENT — CONF VISUAL FIELD
OD_INFERIOR_TEMPORAL_RESTRICTION: 0
OS_INFERIOR_TEMPORAL_RESTRICTION: 0
OS_SUPERIOR_NASAL_RESTRICTION: 0
OD_SUPERIOR_NASAL_RESTRICTION: 0
OD_SUPERIOR_TEMPORAL_RESTRICTION: 0
OS_NORMAL: 1
OD_INFERIOR_NASAL_RESTRICTION: 0
OD_NORMAL: 1
OS_INFERIOR_NASAL_RESTRICTION: 0
OS_SUPERIOR_TEMPORAL_RESTRICTION: 0

## 2023-12-01 ASSESSMENT — VISUAL ACUITY
OS_CC: 20/20-1
OD_CC: 20/20-1
METHOD: SNELLEN - LINEAR

## 2023-12-01 ASSESSMENT — CUP TO DISC RATIO
OD_RATIO: 0.1
OS_RATIO: 0.1

## 2023-12-01 ASSESSMENT — SLIT LAMP EXAM - LIDS
COMMENTS: NORMAL
COMMENTS: NORMAL

## 2023-12-07 ENCOUNTER — TELEMEDICINE (OUTPATIENT)
Dept: PRIMARY CARE | Facility: CLINIC | Age: 38
End: 2023-12-07
Payer: COMMERCIAL

## 2023-12-07 ENCOUNTER — LAB REQUISITION (OUTPATIENT)
Dept: LAB | Facility: HOSPITAL | Age: 38
End: 2023-12-07

## 2023-12-07 DIAGNOSIS — U07.1 COVID: Primary | ICD-10-CM

## 2023-12-07 LAB — SARS-COV-2 RNA RESP QL NAA+PROBE: DETECTED

## 2023-12-07 PROCEDURE — 99212 OFFICE O/P EST SF 10 MIN: CPT | Performed by: FAMILY MEDICINE

## 2023-12-07 PROCEDURE — 87635 SARS-COV-2 COVID-19 AMP PRB: CPT

## 2023-12-07 NOTE — PATIENT INSTRUCTIONS
Please send me a milogt message if you have any questions or concerns.  FOR NON URGENT questions only.  Allow up to 72 hours for response.    If you have prescription issues or other questions you can email   Yefri Trevizo  Digital Health Coordinator, at   [email or web address]     COVID +  Paxlovid discussed--how to take and how it works and adverse effects--paxlovid prescribed? Yes  CDC isolation guidelines discussed  Low threshold for in person evaluation   Metformin --uses for insulin resistance not DM  Omeprazole --may need 40mg while on paxlovid  Taking 25 of sertraline--     Rest and drink plenty of fluids    Tylenol and or motrin as needed for pain and fever (unless you have been told not to take these because of your personal medical history)    Discussed options and precautions:   Viral versus bacterial infection; use of medications; possible side effects; appropriate over-the-counter medications; possible complications and /or when to follow-up.    Follow-up in 1 to 2 days if not improving.  Follow-up immediately if symptoms worsen.    All red flags requiring in person care were discussed.  All patient's questions were answered.    Limitations to telemedicine include inability to do a complete and accurate physical exam.  Any concerns regarding this were conveyed with the patient and in person follow-up recommended if patient nature of illness does not progress as anticipated during this visit.

## 2023-12-07 NOTE — PROGRESS NOTES
Chief Complaint: URI sxs  HPI: sxs started yesterday-- hit her hard-- no known exposure-- little one started   + test last night and today--    Fever--yes tmax 102  Chills--yes  Aches--yes  Cough--yes-- sl productive--  Exhaustion--YES  Sob or wheeze--no  Chest tightness--yes slight-- no h/o asthma  Sore throat--yes  Congestion--yes  RN/stuffy nose/PND--yes  Headache--yes  Nausea--no  Vomiting--no  Diarrhea--no  Appetite--absent  Fluids--good  Exposures--no known    OTC meds--alt tylenol and motrin---flonase--    VAXXED ? Yes  Prior hx of COVID infection? No    ALL OTHER ROS (-)    General appearance:  Vitals available from patient?Yes  Alert, oriented, pleasant, in no apparent distress Yes  Answers questions appropriatelyYes  Eyes clear?Yes  Is patient in respiratory distressNo    Throat exam Not Available  Is patient coughing during consult:Yes  Audible wheezing noted? :No  Pt sounds congested?: Yes  Sniffing or rhinorrhea?: Yes  Psychiatric: Affect normalYes  Other relevant physical exam:      Pt location in [place] and consent obtained. Telemedicine appropriate evaluation completed. Appropriate physical exam done.      COVID +  Paxlovid discussed--how to take and how it works and adverse effects--paxlovid prescribed? Yes  CDC isolation guidelines discussed  Low threshold for in person evaluation   Metformin --uses for insulin resistance not DM  Omeprazole --may need 40mg while on paxlovid  Taking 25 of sertraline--

## 2023-12-15 ENCOUNTER — APPOINTMENT (OUTPATIENT)
Dept: RADIOLOGY | Facility: CLINIC | Age: 38
End: 2023-12-15
Payer: COMMERCIAL

## 2023-12-15 ENCOUNTER — PATIENT MESSAGE (OUTPATIENT)
Dept: OPHTHALMOLOGY | Facility: CLINIC | Age: 38
End: 2023-12-15

## 2023-12-15 ENCOUNTER — ANCILLARY PROCEDURE (OUTPATIENT)
Dept: RADIOLOGY | Facility: CLINIC | Age: 38
End: 2023-12-15
Payer: COMMERCIAL

## 2023-12-15 ENCOUNTER — LAB (OUTPATIENT)
Dept: LAB | Facility: LAB | Age: 38
End: 2023-12-15
Payer: COMMERCIAL

## 2023-12-15 DIAGNOSIS — H47.10 OPTIC DISC EDEMA: ICD-10-CM

## 2023-12-15 LAB
APTT PPP: 29 SECONDS (ref 27–38)
CREAT SERPL-MCNC: 0.64 MG/DL (ref 0.5–1.05)
ERYTHROCYTE [DISTWIDTH] IN BLOOD BY AUTOMATED COUNT: 14.5 % (ref 11.5–14.5)
GFR SERPL CREATININE-BSD FRML MDRD: >90 ML/MIN/1.73M*2
HCT VFR BLD AUTO: 37.4 % (ref 36–46)
HGB BLD-MCNC: 11.8 G/DL (ref 12–16)
INR PPP: 1.1 (ref 0.9–1.1)
MCH RBC QN AUTO: 25.4 PG (ref 26–34)
MCHC RBC AUTO-ENTMCNC: 31.6 G/DL (ref 32–36)
MCV RBC AUTO: 81 FL (ref 80–100)
NRBC BLD-RTO: 0 /100 WBCS (ref 0–0)
PLATELET # BLD AUTO: 393 X10*3/UL (ref 150–450)
PROTHROMBIN TIME: 12.1 SECONDS (ref 9.8–12.8)
RBC # BLD AUTO: 4.64 X10*6/UL (ref 4–5.2)
WBC # BLD AUTO: 8.4 X10*3/UL (ref 4.4–11.3)

## 2023-12-15 PROCEDURE — 82565 ASSAY OF CREATININE: CPT

## 2023-12-15 PROCEDURE — 85610 PROTHROMBIN TIME: CPT

## 2023-12-15 PROCEDURE — 70553 MRI BRAIN STEM W/O & W/DYE: CPT

## 2023-12-15 PROCEDURE — A9575 INJ GADOTERATE MEGLUMI 0.1ML: HCPCS | Performed by: PSYCHIATRY & NEUROLOGY

## 2023-12-15 PROCEDURE — 85027 COMPLETE CBC AUTOMATED: CPT

## 2023-12-15 PROCEDURE — 85730 THROMBOPLASTIN TIME PARTIAL: CPT

## 2023-12-15 PROCEDURE — 2550000001 HC RX 255 CONTRASTS: Performed by: PSYCHIATRY & NEUROLOGY

## 2023-12-15 PROCEDURE — 70545 MR ANGIOGRAPHY HEAD W/DYE: CPT | Mod: 59

## 2023-12-15 PROCEDURE — 36415 COLL VENOUS BLD VENIPUNCTURE: CPT

## 2023-12-15 RX ORDER — GADOTERATE MEGLUMINE 376.9 MG/ML
26 INJECTION INTRAVENOUS
Status: COMPLETED | OUTPATIENT
Start: 2023-12-15 | End: 2023-12-15

## 2023-12-15 RX ORDER — GADOTERATE MEGLUMINE 376.9 MG/ML
26 INJECTION INTRAVENOUS
Status: SHIPPED | OUTPATIENT
Start: 2023-12-15

## 2023-12-15 RX ADMIN — GADOTERATE MEGLUMINE 26 ML: 376.9 INJECTION INTRAVENOUS at 10:46

## 2023-12-16 NOTE — PROGRESS NOTES
"Assessment/Plan   {Assess/PlanSmarinks:02594}    Marizol JAMEEL Medina, APRN-CNM     Subjective   Tamra Tai is a 38 y.o. female who is here for a routine exam. Periods are {gyn period regularity:715}, lasting {numbers; 0-10:85378} days. Dysmenorrhea:{gyn dysmenorrhea:716}. Cyclic symptoms include {sys gyn cyclic sx:83441}. No intermenstrual bleeding, spotting, or discharge.    Current contraception: {contraceptive method:5051}  History of abnormal Pap smear: {yes***/no:76382::\"no\"}  History of LEEP or cryo:  {yes***/no:91166::\"no\"}  Family history of uterine or ovarian cancer: {yes***/no:70909::\"no\"}  Family history of breast cancer: {yes***/no:60822::\"no\"}  Regular self breast exam: {yes/no:63}  History of abnormal mammogram: {yes***/no:51247::\"no\"}    She has issues with lubrication: {yes***/no:00218::\"no\"}  She reports issue with orgasms: {yes***/no:24885::\"no\"}  She reports pain with sexual activity: {yes***/no:87234::\"no\"}  She reports sexual activity with: (male***/female***/both:61672::\"no\"}    Menstrual History:  OB History               Para        Term                AB        Living   2         SAB        IAB        Ectopic        Multiple        Live Births   2                Menarche age: ***  No LMP recorded.         ROS    Objective   There were no vitals taken for this visit.    General:   Alert and oriented x 3   Heart:  Thyroid: Regular rate, rhythm  Euthyroid, normal shape and size   Lungs:  Breast: Clear to auscultation bilaterally  Symmetrical, no skin changes/nipple discharge, redness, tenderness, no masses palpated bilaterally   Abdomen: Soft, non tender   Vulva: EGBUS normal   Vagina: Pink, normal discharge   Cervix: No CMT   Uterus: Normal shape, size   Adnexa: NT bilaterally     "

## 2023-12-18 ENCOUNTER — LAB REQUISITION (OUTPATIENT)
Dept: LAB | Facility: HOSPITAL | Age: 38
End: 2023-12-18
Payer: COMMERCIAL

## 2023-12-18 ENCOUNTER — HOSPITAL ENCOUNTER (OUTPATIENT)
Dept: RADIOLOGY | Facility: HOSPITAL | Age: 38
Discharge: HOME | End: 2023-12-18
Payer: COMMERCIAL

## 2023-12-18 VITALS
RESPIRATION RATE: 18 BRPM | OXYGEN SATURATION: 100 % | SYSTOLIC BLOOD PRESSURE: 121 MMHG | DIASTOLIC BLOOD PRESSURE: 77 MMHG | HEART RATE: 73 BPM

## 2023-12-18 DIAGNOSIS — H47.10 OPTIC DISC EDEMA: ICD-10-CM

## 2023-12-18 DIAGNOSIS — H47.10 UNSPECIFIED PAPILLEDEMA: ICD-10-CM

## 2023-12-18 LAB
APPEARANCE CSF: CLEAR
COLOR CSF: COLORLESS
COLOR SPUN CSF: COLORLESS
GLUCOSE CSF-MCNC: 56 MG/DL (ref 40–70)
HOLD SPECIMEN: NORMAL
HSV1 DNA CSF QL NAA+PROBE: NOT DETECTED
HSV2 DNA CSF QL NAA+PROBE: NOT DETECTED
PROT CSF-MCNC: 31 MG/DL (ref 15–45)
RBC # CSF AUTO: 245 /UL (ref 0–5)
TOTAL CELLS COUNTED CSF: 26
TUBE # CSF: ABNORMAL
WBC # CSF AUTO: 2 /UL (ref 1–5)

## 2023-12-18 PROCEDURE — 96372 THER/PROPH/DIAG INJ SC/IM: CPT | Mod: 59 | Performed by: PSYCHIATRY & NEUROLOGY

## 2023-12-18 PROCEDURE — 87529 HSV DNA AMP PROBE: CPT

## 2023-12-18 PROCEDURE — 87529 HSV DNA AMP PROBE: CPT | Mod: OUT | Performed by: PSYCHIATRY & NEUROLOGY

## 2023-12-18 PROCEDURE — 2500000005 HC RX 250 GENERAL PHARMACY W/O HCPCS: Performed by: PSYCHIATRY & NEUROLOGY

## 2023-12-18 PROCEDURE — 89051 BODY FLUID CELL COUNT: CPT | Performed by: PSYCHIATRY & NEUROLOGY

## 2023-12-18 PROCEDURE — 62328 DX LMBR SPI PNXR W/FLUOR/CT: CPT | Performed by: RADIOLOGY

## 2023-12-18 PROCEDURE — 84157 ASSAY OF PROTEIN OTHER: CPT | Performed by: PSYCHIATRY & NEUROLOGY

## 2023-12-18 PROCEDURE — 62328 DX LMBR SPI PNXR W/FLUOR/CT: CPT

## 2023-12-18 RX ORDER — LIDOCAINE HYDROCHLORIDE 10 MG/ML
5 INJECTION, SOLUTION EPIDURAL; INFILTRATION; INTRACAUDAL; PERINEURAL ONCE
Status: COMPLETED | OUTPATIENT
Start: 2023-12-18 | End: 2023-12-18

## 2023-12-18 RX ADMIN — LIDOCAINE HYDROCHLORIDE 50 MG: 10 INJECTION, SOLUTION EPIDURAL; INFILTRATION; INTRACAUDAL; PERINEURAL at 11:45

## 2023-12-18 ASSESSMENT — PAIN - FUNCTIONAL ASSESSMENT
PAIN_FUNCTIONAL_ASSESSMENT: 0-10
PAIN_FUNCTIONAL_ASSESSMENT: 0-10

## 2023-12-18 ASSESSMENT — PAIN SCALES - GENERAL
PAINLEVEL_OUTOF10: 0 - NO PAIN
PAINLEVEL_OUTOF10: 0 - NO PAIN

## 2023-12-18 NOTE — POST-PROCEDURE NOTE
.Procedure: Fluoroscopic-Guided Lumbar Puncture    Indications:      Procedure Details: After discussion of risks, benefits, and alternatives, oral and written informed consent was obtained. The patient was placed in prone position on the exam table. The L2-3 interspace was then marked under fluoroscopy. The patient was then prepped and draped in sterile fashion. Local anesthesia was achieved with 5 mL 1% Lidocaine solution. A 20 gauge spinal needle was then introduced at the L2-3 level under direct fluoroscopic guidance until return of CSF was demonstrated. Opening pressure was measured at 22  cmH2O.     10 mL of clear CSF was collected in 4 tubes. The stylet was then replaced and the spinal needle was removed. Hemostasis was achieved with direct pressure and the area was dressed with a Band-Aid. Please refer to PACS for the full dictation.     Complications: None; patient tolerated the procedure well    Condition: Stable

## 2023-12-19 DIAGNOSIS — G93.2 IDIOPATHIC INTRACRANIAL HYPERTENSION: Primary | ICD-10-CM

## 2023-12-19 RX ORDER — ACETAZOLAMIDE 500 MG/1
500 CAPSULE, EXTENDED RELEASE ORAL 2 TIMES DAILY
Qty: 60 CAPSULE | Refills: 3 | Status: SHIPPED | OUTPATIENT
Start: 2023-12-19 | End: 2024-01-09 | Stop reason: SDUPTHER

## 2023-12-21 ENCOUNTER — APPOINTMENT (OUTPATIENT)
Dept: OBSTETRICS AND GYNECOLOGY | Facility: CLINIC | Age: 38
End: 2023-12-21

## 2024-01-04 ENCOUNTER — APPOINTMENT (OUTPATIENT)
Dept: OPHTHALMOLOGY | Facility: CLINIC | Age: 39
End: 2024-01-04
Payer: COMMERCIAL

## 2024-01-08 NOTE — PROGRESS NOTES
Assessment and Plan    12/15/2023 MRI brain with contrast & MRV head, which I personally reviewed, shows no lesion.    Prior head imaging.    12/18/2023 lumbar puncture opening pressure 22 cm water, , WBC 2, protein 31 & glucose 56. HSV PCR negative.    Lab Results   Component Value Date/Time    SEDRATE 1 08/10/2021 1437    SEDRATE 21 (H) 02/10/2021 1434    SEDRATE 5 09/19/2020 1152    CRP 0.3 08/10/2021 1437    CRP 0.7 02/10/2021 1434    CRP 0.3 09/19/2020 1152      Lab Results   Component Value Date/Time    GOGVFYWM02 565 08/10/2021 1437    ESGXDEFJ02 421 02/10/2021 1434    QKREWMWK05 459 12/05/2019 0811      12/09/2020 RPR non-reactive (NR).    1/9/2024 OCT RNFL  & . (Stable)  12/01/2023 OCT RNFL  & .    1/9/2024 HVF 24-2 OD fovea 32, wnl MD +0.11 & OS fovea 20, wnl MD -0.14.  12/01/2023 HVF 24-2 OD fovea 39, scatter MD -0.47 & OS fovea 38, early nasal step versus rim defect MD -0.02.    This 38 year-old woman with a history of HTN, family HSV-2 Mollaret meningitis, thyroid lobectomy with hypothyroidism, insulin resistance, obesity, JENNY, acne, Raynaud syndrome presents for evaluation of elevated optic nerves.    She meets criteria for idiopathic intracranial hypertension without evidence of venous sinus thrombosis, intracranial mass or meningitic disease. She is responding well to acetazolamide, so I recommend continued treatment for now.    Plan    Continue acetazolamide 500 mg PO BID. We discussed expected side effects.  Weight loss with Weight Management referral.    Follow up in about 3 months weeks with OCT & HVF or sooner if not improving. (Dilated 12/01/2023)

## 2024-01-09 ENCOUNTER — OFFICE VISIT (OUTPATIENT)
Dept: OPHTHALMOLOGY | Facility: CLINIC | Age: 39
End: 2024-01-09
Payer: COMMERCIAL

## 2024-01-09 DIAGNOSIS — H47.10 OPTIC DISC EDEMA: Primary | ICD-10-CM

## 2024-01-09 DIAGNOSIS — G93.2 IDIOPATHIC INTRACRANIAL HYPERTENSION: ICD-10-CM

## 2024-01-09 PROCEDURE — 92133 CPTRZD OPH DX IMG PST SGM ON: CPT | Performed by: PSYCHIATRY & NEUROLOGY

## 2024-01-09 PROCEDURE — 99214 OFFICE O/P EST MOD 30 MIN: CPT | Performed by: PSYCHIATRY & NEUROLOGY

## 2024-01-09 PROCEDURE — 92083 EXTENDED VISUAL FIELD XM: CPT | Performed by: PSYCHIATRY & NEUROLOGY

## 2024-01-09 RX ORDER — ACETAZOLAMIDE 500 MG/1
500 CAPSULE, EXTENDED RELEASE ORAL 2 TIMES DAILY
Qty: 180 CAPSULE | Refills: 3 | Status: SHIPPED | OUTPATIENT
Start: 2024-01-09

## 2024-01-09 ASSESSMENT — SLIT LAMP EXAM - LIDS
COMMENTS: NORMAL
COMMENTS: NORMAL

## 2024-01-09 ASSESSMENT — EXTERNAL EXAM - RIGHT EYE: OD_EXAM: NORMAL

## 2024-01-09 ASSESSMENT — ENCOUNTER SYMPTOMS
RESPIRATORY NEGATIVE: 0
ENDOCRINE NEGATIVE: 0
NEUROLOGICAL NEGATIVE: 0
ALLERGIC/IMMUNOLOGIC NEGATIVE: 0
GASTROINTESTINAL NEGATIVE: 0
CARDIOVASCULAR NEGATIVE: 0
PSYCHIATRIC NEGATIVE: 0
EYES NEGATIVE: 1
CONSTITUTIONAL NEGATIVE: 0
MUSCULOSKELETAL NEGATIVE: 0
HEMATOLOGIC/LYMPHATIC NEGATIVE: 0

## 2024-01-09 ASSESSMENT — CONF VISUAL FIELD
OD_INFERIOR_TEMPORAL_RESTRICTION: 0
OD_SUPERIOR_NASAL_RESTRICTION: 0
OS_SUPERIOR_NASAL_RESTRICTION: 0
OD_NORMAL: 1
OD_INFERIOR_NASAL_RESTRICTION: 0
OS_INFERIOR_TEMPORAL_RESTRICTION: 0
OS_SUPERIOR_TEMPORAL_RESTRICTION: 0
OS_NORMAL: 1
OS_INFERIOR_NASAL_RESTRICTION: 0
OD_SUPERIOR_TEMPORAL_RESTRICTION: 0

## 2024-01-09 ASSESSMENT — VISUAL ACUITY
METHOD: SNELLEN - LINEAR
OD_CC: 20/20-3
OS_CC: 20/20

## 2024-01-09 ASSESSMENT — CUP TO DISC RATIO
OD_RATIO: 0.1
OS_RATIO: 0.1

## 2024-01-09 ASSESSMENT — TONOMETRY
IOP_METHOD: TONOPEN
OS_IOP_MMHG: 13
OD_IOP_MMHG: 16

## 2024-01-09 ASSESSMENT — EXTERNAL EXAM - LEFT EYE: OS_EXAM: NORMAL

## 2024-01-27 NOTE — PROGRESS NOTES
Assessment/Plan   Problem List Items Addressed This Visit    None  Visit Diagnoses         Codes    Screening for cervical cancer    -  Primary Z12.4    Relevant Orders    THINPREP PAP TEST    Well woman exam     Z01.419            Marizol Medina, RAMESH-YOBANY     Subjective   Tamra Tai is a 38 y.o. female who is here for a routine exam. Periods are regular every 23-28 days, lasting  5-10  days. Dysmenorrhea:mild, occurring first 1-2 days of flow. Cyclic symptoms include  PMDD . No intermenstrual bleeding, spotting, or discharge.  Had consult for Mirena IUD at [facility], but was unable to get it covered.  Desires to get placed today.    Current contraception: none  History of abnormal Pap smear: yes - other HPV positive  History of LEEP or cryo:  no  Family history of uterine or ovarian cancer: yes - paternal grandmother uterine and ovarian  Family history of breast cancer: yes - maternal distant cousins  Regular self breast exam: yes  History of abnormal mammogram: no    She has issues with lubrication: no  She reports issue with orgasms: no  She reports pain with sexual activity: no  She reports sexual activity with: Not sexually active x 1.5 years    Menstrual History:  OB History               Para        Term                AB        Living   2         SAB        IAB        Ectopic        Multiple        Live Births   2                Menarche age: 11  No LMP recorded.         ROS    Objective   There were no vitals taken for this visit.    General:   Alert and oriented x 3   Heart:  Thyroid: Regular rate, rhythm  Euthyroid, normal shape and size   Lungs:  Breast: Clear to auscultation bilaterally  Symmetrical, no skin changes/nipple discharge, redness, tenderness, no masses palpated bilaterally   Abdomen: Soft, non tender   Vulva: EGBUS normal   Vagina: Pink, normal discharge   Cervix: No CMT   Uterus: Will check with IUD check   Adnexa:    IUD Insertion Procedure Note    Indications:   HMB    Procedure Details   Urine pregnancy test was not done.  The risks (including infection, bleeding, pain, and uterine perforation) and benefits of the procedure were explained to the patient and Verbal informed consent was obtained.      Procedure: Mirena (IUD) placement  Cervix cleansed with Betadine. Uterus sounded to 9 cm.   Local anesthesia:  None  Tenaculum used:  Yes, single tooth, anterior  IUD inserted without difficulty.   String visible and trimmed to 2cm.  Patient tolerated procedure well.    Condition:  Stable      Complications:  None      Plan:  The patient was advised to call for any fever or for prolonged or severe pain or bleeding. She was advised to use OTC acetaminophen as needed for mild to moderate pain.       RAMESH Walker-YOBANY

## 2024-01-29 ENCOUNTER — OFFICE VISIT (OUTPATIENT)
Dept: OBSTETRICS AND GYNECOLOGY | Facility: CLINIC | Age: 39
End: 2024-01-29
Payer: COMMERCIAL

## 2024-01-29 VITALS
WEIGHT: 291 LBS | BODY MASS INDEX: 49.68 KG/M2 | DIASTOLIC BLOOD PRESSURE: 70 MMHG | HEIGHT: 64 IN | SYSTOLIC BLOOD PRESSURE: 118 MMHG

## 2024-01-29 DIAGNOSIS — Z30.430 ENCOUNTER FOR INSERTION OF MIRENA IUD: ICD-10-CM

## 2024-01-29 DIAGNOSIS — Z01.419 WELL WOMAN EXAM: ICD-10-CM

## 2024-01-29 DIAGNOSIS — Z12.4 SCREENING FOR CERVICAL CANCER: Primary | ICD-10-CM

## 2024-01-29 PROCEDURE — 87624 HPV HI-RISK TYP POOLED RSLT: CPT

## 2024-01-29 PROCEDURE — 3008F BODY MASS INDEX DOCD: CPT | Performed by: ADVANCED PRACTICE MIDWIFE

## 2024-01-29 PROCEDURE — 88175 CYTOPATH C/V AUTO FLUID REDO: CPT

## 2024-01-29 PROCEDURE — 1036F TOBACCO NON-USER: CPT | Performed by: ADVANCED PRACTICE MIDWIFE

## 2024-01-29 PROCEDURE — 3078F DIAST BP <80 MM HG: CPT | Performed by: ADVANCED PRACTICE MIDWIFE

## 2024-01-29 PROCEDURE — 58300 INSERT INTRAUTERINE DEVICE: CPT | Performed by: ADVANCED PRACTICE MIDWIFE

## 2024-01-29 PROCEDURE — 3074F SYST BP LT 130 MM HG: CPT | Performed by: ADVANCED PRACTICE MIDWIFE

## 2024-01-29 PROCEDURE — 99385 PREV VISIT NEW AGE 18-39: CPT | Performed by: ADVANCED PRACTICE MIDWIFE

## 2024-02-09 LAB
CYTOLOGY CMNT CVX/VAG CYTO-IMP: NORMAL
HPV HR 12 DNA GENITAL QL NAA+PROBE: NEGATIVE
HPV HR GENOTYPES PNL CVX NAA+PROBE: NEGATIVE
HPV16 DNA SPEC QL NAA+PROBE: NEGATIVE
HPV18 DNA SPEC QL NAA+PROBE: NEGATIVE
LAB AP HPV GENOTYPE QUESTION: YES
LAB AP HPV HR: NORMAL
LAB AP PREVIOUS ABNORMAL HISTORY: NORMAL
LABORATORY COMMENT REPORT: NORMAL
LMP START DATE: NORMAL
PATH REPORT.TOTAL CANCER: NORMAL

## 2024-02-28 ENCOUNTER — HOSPITAL ENCOUNTER (OUTPATIENT)
Dept: RADIOLOGY | Facility: CLINIC | Age: 39
Discharge: HOME | End: 2024-02-28
Payer: COMMERCIAL

## 2024-02-28 DIAGNOSIS — E04.1 NONTOXIC SINGLE THYROID NODULE: ICD-10-CM

## 2024-02-29 ENCOUNTER — OFFICE VISIT (OUTPATIENT)
Dept: OBSTETRICS AND GYNECOLOGY | Facility: CLINIC | Age: 39
End: 2024-02-29
Payer: COMMERCIAL

## 2024-02-29 VITALS — DIASTOLIC BLOOD PRESSURE: 62 MMHG | WEIGHT: 293 LBS | SYSTOLIC BLOOD PRESSURE: 120 MMHG | BODY MASS INDEX: 50.29 KG/M2

## 2024-02-29 DIAGNOSIS — Z30.431 IUD CHECK UP: Primary | ICD-10-CM

## 2024-02-29 PROCEDURE — 1036F TOBACCO NON-USER: CPT | Performed by: ADVANCED PRACTICE MIDWIFE

## 2024-02-29 PROCEDURE — 3008F BODY MASS INDEX DOCD: CPT | Performed by: ADVANCED PRACTICE MIDWIFE

## 2024-02-29 PROCEDURE — 3078F DIAST BP <80 MM HG: CPT | Performed by: ADVANCED PRACTICE MIDWIFE

## 2024-02-29 PROCEDURE — 3074F SYST BP LT 130 MM HG: CPT | Performed by: ADVANCED PRACTICE MIDWIFE

## 2024-02-29 PROCEDURE — 99213 OFFICE O/P EST LOW 20 MIN: CPT | Performed by: ADVANCED PRACTICE MIDWIFE

## 2024-02-29 ASSESSMENT — ENCOUNTER SYMPTOMS
ALLERGIC/IMMUNOLOGIC NEGATIVE: 0
HEMATOLOGIC/LYMPHATIC NEGATIVE: 0
RESPIRATORY NEGATIVE: 0
NEUROLOGICAL NEGATIVE: 0
MUSCULOSKELETAL NEGATIVE: 0
CARDIOVASCULAR NEGATIVE: 0
EYES NEGATIVE: 0
GASTROINTESTINAL NEGATIVE: 0
ENDOCRINE NEGATIVE: 0
PSYCHIATRIC NEGATIVE: 0
CONSTITUTIONAL NEGATIVE: 0

## 2024-02-29 ASSESSMENT — PAIN SCALES - GENERAL: PAINLEVEL: 0-NO PAIN

## 2024-02-29 NOTE — PROGRESS NOTES
Assessment/Plan   Problem List Items Addressed This Visit    None  Visit Diagnoses         Codes    IUD check up    -  Primary Z30.431            Sharmin Cheung RN    Subjective   Tamra Tai is a 38 y.o.  who presents for IUD check. Patient reports 14 days of light spotting since placement but no heavy menses as she is used to. No abdominal cramping.     Type of IUD:  Mirena  Date of insertion:  known 2024  Other relevant history/information:  none    Objective   /62   Wt 133 kg (293 lb)   LMP 2024 (Exact Date)   BMI 50.29 kg/m²     PHYSICAL EXAM  Orientation:  Alert and Oriented x 3  Mood:  Calm and Cooperative  Lungs:  Unlabored  Abdomen:  Soft NT  Vagina:  Moist with physiologic discharge  Cervix:  L/T/C without CMT.  IUD strings present.  Chamber not palpated  Uterus:  Small, mobile, NT  Adnexa:  NT bilaterally    Follow up for well woman exam in 1 year.

## 2024-03-04 DIAGNOSIS — F32.5 MAJOR DEPRESSIVE DISORDER WITH SINGLE EPISODE, IN FULL REMISSION (CMS-HCC): ICD-10-CM

## 2024-03-04 DIAGNOSIS — F41.9 ANXIETY: ICD-10-CM

## 2024-03-04 RX ORDER — SERTRALINE HYDROCHLORIDE 50 MG/1
50 TABLET, FILM COATED ORAL DAILY
Qty: 30 TABLET | Refills: 3 | Status: SHIPPED | OUTPATIENT
Start: 2024-03-04 | End: 2024-07-02

## 2024-03-11 ENCOUNTER — OFFICE VISIT (OUTPATIENT)
Dept: ENDOCRINOLOGY | Facility: CLINIC | Age: 39
End: 2024-03-11
Payer: COMMERCIAL

## 2024-03-11 VITALS
HEIGHT: 64 IN | WEIGHT: 292 LBS | HEART RATE: 101 BPM | SYSTOLIC BLOOD PRESSURE: 139 MMHG | DIASTOLIC BLOOD PRESSURE: 101 MMHG | BODY MASS INDEX: 49.85 KG/M2

## 2024-03-11 DIAGNOSIS — R63.5 WEIGHT GAIN: ICD-10-CM

## 2024-03-11 DIAGNOSIS — E24.9 CUSHING SYNDROME (MULTI): ICD-10-CM

## 2024-03-11 DIAGNOSIS — E89.0 H/O PARTIAL THYROIDECTOMY: Primary | ICD-10-CM

## 2024-03-11 PROCEDURE — 3075F SYST BP GE 130 - 139MM HG: CPT | Performed by: STUDENT IN AN ORGANIZED HEALTH CARE EDUCATION/TRAINING PROGRAM

## 2024-03-11 PROCEDURE — 1036F TOBACCO NON-USER: CPT | Performed by: STUDENT IN AN ORGANIZED HEALTH CARE EDUCATION/TRAINING PROGRAM

## 2024-03-11 PROCEDURE — 3080F DIAST BP >= 90 MM HG: CPT | Performed by: STUDENT IN AN ORGANIZED HEALTH CARE EDUCATION/TRAINING PROGRAM

## 2024-03-11 PROCEDURE — 99215 OFFICE O/P EST HI 40 MIN: CPT | Performed by: STUDENT IN AN ORGANIZED HEALTH CARE EDUCATION/TRAINING PROGRAM

## 2024-03-11 PROCEDURE — 3008F BODY MASS INDEX DOCD: CPT | Performed by: STUDENT IN AN ORGANIZED HEALTH CARE EDUCATION/TRAINING PROGRAM

## 2024-03-11 RX ORDER — DEXAMETHASONE 1 MG/1
1 TABLET ORAL ONCE
Qty: 1 TABLET | Refills: 0 | Status: SHIPPED | OUTPATIENT
Start: 2024-03-11 | End: 2024-03-13 | Stop reason: SDUPTHER

## 2024-03-11 ASSESSMENT — PAIN SCALES - GENERAL: PAINLEVEL: 0-NO PAIN

## 2024-03-11 NOTE — PROGRESS NOTES
38 F PMH: JENNY on CPAP, HTN, Cholecystecomty    Coming in today for thyroid evaluation, she was previously seen by Dr. Campbell     S/p left hemithyroidectomy for an indeterminate thyroid nodule in 2016   Final pathology was reportedly benign     Never on thyroid replacement     Initially seen by Endo in April 2023 for weight gain   Endo work up:  TFTs A1c wnl   Tpo negative   Dex suppression normal in 4/2023     Last ultrasound was in 10/2023 showing   Heterogenous right lobe   5mm hypoechoic right interpolar   6mm isoechoic right upper   8mm solid isoechoic right upper     GYN:  Has Mirena IUD     2) weight gain  Steadily gaining since second child was born in 2021   Previously did weight watchers   Doing calorie restriction     Activity; sedentary has a toddle            Past Medical History:   Diagnosis Date    Cataract     Essential (primary) hypertension 12/11/2019    Hypertension     Family History   Problem Relation Name Age of Onset    Hypertension Mother      Anxiety disorder Mother      Sarcoidosis Mother      Heart disease Father      Hypertension Father      Psoriasis Father      Hyperlipidemia Father      Hypertension Brother      Hypertension Brother      Clotting disorder Brother      Hypertension Other      Thyroid cancer Other      Thyroid disease Other      Pancreatic cancer Other      Glaucoma Neg Hx      Macular degeneration Neg Hx       Social History     Socioeconomic History    Marital status: Single     Spouse name: Not on file    Number of children: 2    Years of education: Not on file    Highest education level: Not on file   Occupational History    Occupation:     Tobacco Use    Smoking status: Not on file     Passive exposure: Never    Smokeless tobacco: Not on file   Vaping Use    Vaping Use: Never used   Substance and Sexual Activity    Alcohol use: Yes     Alcohol/week: 2.0 standard drinks of alcohol     Types: 2 Cans of beer per week     Comment: a month    Drug use:  Never    Sexual activity: Not Currently     Partners: Male   Other Topics Concern    Not on file   Social History Narrative    Not on file     Social Determinants of Health     Financial Resource Strain: Not on file   Food Insecurity: Not on file   Transportation Needs: Not on file   Physical Activity: Not on file   Stress: Not on file   Social Connections: Not on file   Intimate Partner Violence: Not on file   Housing Stability: Not on file   Social: works for  as a      Non restorative sleep  Weight gain about 15 lbs over the last 6 lbs   ROS reviewed and is negative except for pertinent findings noted on HPI    Physical Exam  Constitutional:       Comments: Redness on the face   Neck:      Comments: Thyroidectomy scar, has Scfat pad  Cardiovascular:      Rate and Rhythm: Normal rate and regular rhythm.   Abdominal:      Comments: Abdominal obesity with violaceous striae in the abdomen    Musculoskeletal:         General: No swelling or tenderness.      Comments: No proximal myopathy   Skin:     General: Skin is warm.   Neurological:      General: No focal deficit present.      Mental Status: She is alert and oriented to person, place, and time.   Psychiatric:         Mood and Affect: Mood normal.         Behavior: Behavior normal.           labs and imaging reviewed, pertinent findings listed on HPI and Impression    Problem List Items Addressed This Visit       Weight gain    Relevant Medications    dexAMETHasone (Decadron) 1 mg tablet    Other Relevant Orders    Creatinine, 24 Hour Urine    Cortisol, Urine, Free    Hemoglobin A1C    Glucose, Fasting    Adrenocorticotropic Hormone (ACTH)    Cortisol AM    Dexamethasone    H/O partial thyroidectomy - Primary    Relevant Orders    Thyroid Stimulating Hormone    Thyroxine, Free    Triiodothyronine, Total     Other Visit Diagnoses       Cushing syndrome (CMS/HCC)        Relevant Medications    dexAMETHasone (Decadron) 1 mg tablet    Other Relevant  Orders    Creatinine, 24 Hour Urine    Cortisol, Urine, Free    Adrenocorticotropic Hormone (ACTH)    Cortisol AM    Dexamethasone          1) history of thyroidectomy   Clinically euthyroid today, we discussed that even if she was biochemically hypothyroid now, would not explain her weight gain all these years her previous TFTs were normal    Repeat Ultrasound in 11/2024   Check TFTs and decide if she needs replacement     2) Weight gain, obesity   -screen fasting BG and A1c  -repeat dex suppression and 24 hr urine cortisol   -briefly discussed weight loss options   -may need weight loss/nutrition program     Follow up in 3-4 moths for weight

## 2024-03-11 NOTE — PATIENT INSTRUCTIONS
Get your baseline fasting labs    Dexamethasone suppression test  Dexamethasone suppression test     -take 1mg of dexamethasone around 10pm or your usual bedtime  -the following morning, around 8am you should go to the lab and get your bloodwork done   -this test is best done if you got good sleep at night and you follow your usual night routing       24 hr urine collection   24 hour urine collection     -You should collect every drop of urine during each 24-hour period. It does not matter how much or little urine is passed each time, as long as every drop is collected.  -Begin the urine collection in the morning after you wake up, after you have emptied your bladder for the first time.  -Urinate (empty the bladder) for the first time and flush it down the toilet. Note the exact time (eg, 6:15 AM). You will begin the urine collection at this time.  -Collect every drop of urine during the day and night in an empty collection bottle. Store the bottle at room temperature or in the refrigerator.  -If you need to have a bowel movement, any urine passed with the bowel movement should be collected. Try not to include feces with the urine collection. If feces does get mixed in, do not try to remove the feces from the urine collection bottle.  -Finish by collecting the first urine passed the next morning, adding it to the collection bottle. This should be within ten minutes before or after the time of the first morning void on the first day (which was flushed). In this example, you would try to void between 6:05 and 6:25 on the second day.

## 2024-03-12 ENCOUNTER — LAB (OUTPATIENT)
Dept: LAB | Facility: LAB | Age: 39
End: 2024-03-12
Payer: COMMERCIAL

## 2024-03-12 DIAGNOSIS — E89.0 H/O PARTIAL THYROIDECTOMY: ICD-10-CM

## 2024-03-12 DIAGNOSIS — R63.5 WEIGHT GAIN: ICD-10-CM

## 2024-03-12 LAB
EST. AVERAGE GLUCOSE BLD GHB EST-MCNC: 103 MG/DL
GLUCOSE P FAST SERPL-MCNC: 97 MG/DL (ref 74–99)
HBA1C MFR BLD: 5.2 %
T3 SERPL-MCNC: 98 NG/DL (ref 60–200)
T4 FREE SERPL-MCNC: 0.97 NG/DL (ref 0.78–1.48)
TSH SERPL-ACNC: 1.27 MIU/L (ref 0.44–3.98)

## 2024-03-12 PROCEDURE — 82947 ASSAY GLUCOSE BLOOD QUANT: CPT

## 2024-03-12 PROCEDURE — 83036 HEMOGLOBIN GLYCOSYLATED A1C: CPT

## 2024-03-12 PROCEDURE — 84439 ASSAY OF FREE THYROXINE: CPT

## 2024-03-12 PROCEDURE — 84480 ASSAY TRIIODOTHYRONINE (T3): CPT

## 2024-03-12 PROCEDURE — 36415 COLL VENOUS BLD VENIPUNCTURE: CPT

## 2024-03-12 PROCEDURE — 84443 ASSAY THYROID STIM HORMONE: CPT

## 2024-03-13 DIAGNOSIS — E24.9 CUSHING SYNDROME (MULTI): ICD-10-CM

## 2024-03-13 DIAGNOSIS — R63.5 WEIGHT GAIN: ICD-10-CM

## 2024-03-13 RX ORDER — DEXAMETHASONE 1 MG/1
1 TABLET ORAL ONCE
Qty: 1 TABLET | Refills: 0 | Status: SHIPPED | OUTPATIENT
Start: 2024-03-13 | End: 2024-03-13

## 2024-04-23 ENCOUNTER — APPOINTMENT (OUTPATIENT)
Dept: RADIOLOGY | Facility: HOSPITAL | Age: 39
End: 2024-04-23
Payer: COMMERCIAL

## 2024-04-23 ENCOUNTER — HOSPITAL ENCOUNTER (EMERGENCY)
Facility: HOSPITAL | Age: 39
Discharge: HOME | End: 2024-04-24
Attending: EMERGENCY MEDICINE
Payer: COMMERCIAL

## 2024-04-23 ENCOUNTER — APPOINTMENT (OUTPATIENT)
Dept: CARDIOLOGY | Facility: HOSPITAL | Age: 39
End: 2024-04-23
Payer: COMMERCIAL

## 2024-04-23 DIAGNOSIS — R19.7 DIARRHEA, UNSPECIFIED TYPE: Primary | ICD-10-CM

## 2024-04-23 DIAGNOSIS — R10.12 LUQ ABDOMINAL PAIN: ICD-10-CM

## 2024-04-23 LAB
ALBUMIN SERPL BCP-MCNC: 4.2 G/DL (ref 3.4–5)
ALP SERPL-CCNC: 90 U/L (ref 33–110)
ALT SERPL W P-5'-P-CCNC: 52 U/L (ref 7–45)
ANION GAP SERPL CALC-SCNC: 12 MMOL/L (ref 10–20)
AST SERPL W P-5'-P-CCNC: 34 U/L (ref 9–39)
B-HCG SERPL-ACNC: <2 MIU/ML
BASOPHILS # BLD AUTO: 0.05 X10*3/UL (ref 0–0.1)
BASOPHILS NFR BLD AUTO: 1.1 %
BILIRUB SERPL-MCNC: 0.3 MG/DL (ref 0–1.2)
BUN SERPL-MCNC: 8 MG/DL (ref 6–23)
CALCIUM SERPL-MCNC: 8.4 MG/DL (ref 8.6–10.3)
CARDIAC TROPONIN I PNL SERPL HS: 4 NG/L (ref 0–13)
CHLORIDE SERPL-SCNC: 106 MMOL/L (ref 98–107)
CO2 SERPL-SCNC: 23 MMOL/L (ref 21–32)
CREAT SERPL-MCNC: 0.66 MG/DL (ref 0.5–1.05)
EGFRCR SERPLBLD CKD-EPI 2021: >90 ML/MIN/1.73M*2
EOSINOPHIL # BLD AUTO: 0.16 X10*3/UL (ref 0–0.7)
EOSINOPHIL NFR BLD AUTO: 3.5 %
ERYTHROCYTE [DISTWIDTH] IN BLOOD BY AUTOMATED COUNT: 14 % (ref 11.5–14.5)
FLUAV RNA RESP QL NAA+PROBE: NOT DETECTED
FLUBV RNA RESP QL NAA+PROBE: NOT DETECTED
GLUCOSE SERPL-MCNC: 92 MG/DL (ref 74–99)
HCT VFR BLD AUTO: 39.2 % (ref 36–46)
HGB BLD-MCNC: 12.2 G/DL (ref 12–16)
IMM GRANULOCYTES # BLD AUTO: 0.01 X10*3/UL (ref 0–0.7)
IMM GRANULOCYTES NFR BLD AUTO: 0.2 % (ref 0–0.9)
LACTATE SERPL-SCNC: 1 MMOL/L (ref 0.4–2)
LIPASE SERPL-CCNC: 26 U/L (ref 9–82)
LYMPHOCYTES # BLD AUTO: 1.59 X10*3/UL (ref 1.2–4.8)
LYMPHOCYTES NFR BLD AUTO: 34.7 %
MCH RBC QN AUTO: 25.9 PG (ref 26–34)
MCHC RBC AUTO-ENTMCNC: 31.1 G/DL (ref 32–36)
MCV RBC AUTO: 83 FL (ref 80–100)
MONOCYTES # BLD AUTO: 0.66 X10*3/UL (ref 0.1–1)
MONOCYTES NFR BLD AUTO: 14.4 %
NEUTROPHILS # BLD AUTO: 2.11 X10*3/UL (ref 1.2–7.7)
NEUTROPHILS NFR BLD AUTO: 46.1 %
NRBC BLD-RTO: 0 /100 WBCS (ref 0–0)
PLATELET # BLD AUTO: 333 X10*3/UL (ref 150–450)
POTASSIUM SERPL-SCNC: 3.8 MMOL/L (ref 3.5–5.3)
PROT SERPL-MCNC: 7 G/DL (ref 6.4–8.2)
RBC # BLD AUTO: 4.71 X10*6/UL (ref 4–5.2)
SARS-COV-2 RNA RESP QL NAA+PROBE: NOT DETECTED
SODIUM SERPL-SCNC: 137 MMOL/L (ref 136–145)
WBC # BLD AUTO: 4.6 X10*3/UL (ref 4.4–11.3)

## 2024-04-23 PROCEDURE — 84484 ASSAY OF TROPONIN QUANT: CPT | Performed by: EMERGENCY MEDICINE

## 2024-04-23 PROCEDURE — 87636 SARSCOV2 & INF A&B AMP PRB: CPT | Performed by: EMERGENCY MEDICINE

## 2024-04-23 PROCEDURE — 85025 COMPLETE CBC W/AUTO DIFF WBC: CPT | Performed by: EMERGENCY MEDICINE

## 2024-04-23 PROCEDURE — 2550000001 HC RX 255 CONTRASTS: Performed by: EMERGENCY MEDICINE

## 2024-04-23 PROCEDURE — 96374 THER/PROPH/DIAG INJ IV PUSH: CPT | Mod: 59

## 2024-04-23 PROCEDURE — 76830 TRANSVAGINAL US NON-OB: CPT

## 2024-04-23 PROCEDURE — 96361 HYDRATE IV INFUSION ADD-ON: CPT

## 2024-04-23 PROCEDURE — 74177 CT ABD & PELVIS W/CONTRAST: CPT

## 2024-04-23 PROCEDURE — 2500000004 HC RX 250 GENERAL PHARMACY W/ HCPCS (ALT 636 FOR OP/ED): Performed by: EMERGENCY MEDICINE

## 2024-04-23 PROCEDURE — 96375 TX/PRO/DX INJ NEW DRUG ADDON: CPT

## 2024-04-23 PROCEDURE — 83605 ASSAY OF LACTIC ACID: CPT | Performed by: EMERGENCY MEDICINE

## 2024-04-23 PROCEDURE — 83690 ASSAY OF LIPASE: CPT | Performed by: EMERGENCY MEDICINE

## 2024-04-23 PROCEDURE — 80053 COMPREHEN METABOLIC PANEL: CPT | Performed by: EMERGENCY MEDICINE

## 2024-04-23 PROCEDURE — 84702 CHORIONIC GONADOTROPIN TEST: CPT | Performed by: EMERGENCY MEDICINE

## 2024-04-23 PROCEDURE — 36415 COLL VENOUS BLD VENIPUNCTURE: CPT | Performed by: EMERGENCY MEDICINE

## 2024-04-23 PROCEDURE — 99285 EMERGENCY DEPT VISIT HI MDM: CPT | Mod: 25

## 2024-04-23 PROCEDURE — 2500000005 HC RX 250 GENERAL PHARMACY W/O HCPCS: Performed by: EMERGENCY MEDICINE

## 2024-04-23 PROCEDURE — 93005 ELECTROCARDIOGRAM TRACING: CPT

## 2024-04-23 PROCEDURE — 74177 CT ABD & PELVIS W/CONTRAST: CPT | Performed by: RADIOLOGY

## 2024-04-23 RX ORDER — KETOROLAC TROMETHAMINE 30 MG/ML
30 INJECTION, SOLUTION INTRAMUSCULAR; INTRAVENOUS ONCE
Status: COMPLETED | OUTPATIENT
Start: 2024-04-23 | End: 2024-04-23

## 2024-04-23 RX ORDER — MORPHINE SULFATE 4 MG/ML
4 INJECTION, SOLUTION INTRAMUSCULAR; INTRAVENOUS ONCE
Status: COMPLETED | OUTPATIENT
Start: 2024-04-23 | End: 2024-04-23

## 2024-04-23 RX ORDER — ONDANSETRON HYDROCHLORIDE 2 MG/ML
4 INJECTION, SOLUTION INTRAVENOUS ONCE
Status: COMPLETED | OUTPATIENT
Start: 2024-04-23 | End: 2024-04-23

## 2024-04-23 RX ADMIN — SODIUM CHLORIDE 1000 ML: 9 INJECTION, SOLUTION INTRAVENOUS at 18:45

## 2024-04-23 RX ADMIN — KETOROLAC TROMETHAMINE 30 MG: 30 INJECTION, SOLUTION INTRAMUSCULAR at 23:29

## 2024-04-23 RX ADMIN — SODIUM CHLORIDE 1000 ML: 9 INJECTION, SOLUTION INTRAVENOUS at 23:35

## 2024-04-23 RX ADMIN — ONDANSETRON 4 MG: 2 INJECTION INTRAMUSCULAR; INTRAVENOUS at 18:44

## 2024-04-23 RX ADMIN — IOHEXOL 75 ML: 350 INJECTION, SOLUTION INTRAVENOUS at 19:48

## 2024-04-23 RX ADMIN — DIPHENHYDRAMINE HYDROCHLORIDE AND LIDOCAINE HYDROCHLORIDE AND ALUMINUM HYDROXIDE AND MAGNESIUM HYDRO 10 ML: KIT at 23:40

## 2024-04-23 RX ADMIN — MORPHINE SULFATE 4 MG: 4 INJECTION, SOLUTION INTRAMUSCULAR; INTRAVENOUS at 18:45

## 2024-04-23 ASSESSMENT — COLUMBIA-SUICIDE SEVERITY RATING SCALE - C-SSRS
2. HAVE YOU ACTUALLY HAD ANY THOUGHTS OF KILLING YOURSELF?: NO
6. HAVE YOU EVER DONE ANYTHING, STARTED TO DO ANYTHING, OR PREPARED TO DO ANYTHING TO END YOUR LIFE?: NO
1. IN THE PAST MONTH, HAVE YOU WISHED YOU WERE DEAD OR WISHED YOU COULD GO TO SLEEP AND NOT WAKE UP?: NO

## 2024-04-23 ASSESSMENT — PAIN SCALES - GENERAL
PAINLEVEL_OUTOF10: 7
PAINLEVEL_OUTOF10: 6

## 2024-04-23 ASSESSMENT — PAIN DESCRIPTION - LOCATION: LOCATION: ABDOMEN

## 2024-04-23 ASSESSMENT — PAIN - FUNCTIONAL ASSESSMENT: PAIN_FUNCTIONAL_ASSESSMENT: 0-10

## 2024-04-23 ASSESSMENT — PAIN DESCRIPTION - ORIENTATION: ORIENTATION: LEFT;OUTER

## 2024-04-23 NOTE — ED PROVIDER NOTES
HPI   Chief Complaint   Patient presents with    Diarrhea       38-year-old woman with past medical history ofHypertension, Mollaret meningitis, thyroid, obesity, IIH who does present with complaint of diarrhea x 4 days.  10-15 times per day.  Follows associated left-sided upper quadrant pain.  Denies any recent biotic use.  No travel history.  No sick contacts.  No dysuria hematuria flank pain.  No prior history reported of C. difficile.  No prior history reported of ulcerative colitis or Crohn's.  Does report this year several episodes of similar episodes of diarrhea.                        [place] Coma Scale Score: 15                     Patient History   Past Medical History:   Diagnosis Date    Cataract     Essential (primary) hypertension 12/11/2019    Hypertension     Past Surgical History:   Procedure Laterality Date    OTHER SURGICAL HISTORY  05/25/2017    Thyroid Surgery [facility]-Thyroidectomy Left Lobe     Family History   Problem Relation Name Age of Onset    Hypertension Mother      Anxiety disorder Mother      Sarcoidosis Mother      Heart disease Father      Hypertension Father      Psoriasis Father      Hyperlipidemia Father      Hypertension Brother      Hypertension Brother      Clotting disorder Brother      Hypertension Other      Thyroid cancer Other      Thyroid disease Other      Pancreatic cancer Other      Glaucoma Neg Hx      Macular degeneration Neg Hx       Social History     Tobacco Use    Smoking status: Not on file     Passive exposure: Never    Smokeless tobacco: Not on file   Vaping Use    Vaping status: Never Used   Substance Use Topics    Alcohol use: Yes     Alcohol/week: 2.0 standard drinks of alcohol     Types: 2 Cans of beer per week     Comment: a month    Drug use: Never       Physical Exam   ED Triage Vitals [04/23/24 1712]   Temperature Heart Rate Respirations BP   36.5 °C (97.7 °F) (!) 109 15 123/89      Pulse Ox Temp src Heart Rate Source Patient Position   97 % -- -- --       BP Location FiO2 (%)     -- --       Physical Exam  Vitals and nursing note reviewed.   Constitutional:       Appearance: Normal appearance. She is obese.   HENT:      Head: Normocephalic and atraumatic.      Nose: Nose normal.      Mouth/Throat:      Mouth: Mucous membranes are moist.      Pharynx: Oropharynx is clear.   Eyes:      Extraocular Movements: Extraocular movements intact.      Conjunctiva/sclera: Conjunctivae normal.      Pupils: Pupils are equal, round, and reactive to light.   Cardiovascular:      Rate and Rhythm: Normal rate and regular rhythm.      Pulses: Normal pulses.      Heart sounds: Normal heart sounds.   Pulmonary:      Effort: Pulmonary effort is normal.      Breath sounds: Normal breath sounds.   Abdominal:      General: Abdomen is flat.      Tenderness: There is abdominal tenderness. There is no right CVA tenderness, left CVA tenderness, guarding or rebound.      Comments: To palpation left upper quadrant left lower quadrant   Musculoskeletal:         General: Normal range of motion.      Cervical back: Normal range of motion and neck supple.   Skin:     General: Skin is warm.      Capillary Refill: Capillary refill takes less than 2 seconds.   Neurological:      General: No focal deficit present.      Mental Status: She is alert and oriented to person, place, and time. Mental status is at baseline.      Sensory: No sensory deficit.      Motor: No weakness.   Psychiatric:         Mood and Affect: Mood normal.         Behavior: Behavior normal.         Thought Content: Thought content normal.         Judgment: Judgment normal.         ED Course & MDM   Diagnoses as of 04/25/24 1425   Diarrhea, unspecified type   LUQ abdominal pain       Medical Decision Making  EKG interpreted by me showed normal sinus tachycardia at a rate of 101 with no acute ischemic changes.  No STEMI.  No A-fib.  IV is placed and labs drawn including CBC, CMP, COVID and flu, pregnancy test and CT scan of the abdomen  pelvis.  Patient signed out to oncoming attending pending reassessment and final labs and imaging        Procedure  Procedures     Davion Forbes MD  04/25/24 [id number]

## 2024-04-24 VITALS
DIASTOLIC BLOOD PRESSURE: 79 MMHG | BODY MASS INDEX: 49 KG/M2 | WEIGHT: 287 LBS | RESPIRATION RATE: 20 BRPM | SYSTOLIC BLOOD PRESSURE: 126 MMHG | HEIGHT: 64 IN | HEART RATE: 81 BPM | TEMPERATURE: 97.7 F | OXYGEN SATURATION: 97 %

## 2024-04-24 LAB
ATRIAL RATE: 104 BPM
P AXIS: 54 DEGREES
P OFFSET: 180 MS
P ONSET: 131 MS
PR INTERVAL: 174 MS
Q ONSET: 218 MS
QRS COUNT: 17 BEATS
QRS DURATION: 82 MS
QT INTERVAL: 348 MS
QTC CALCULATION(BAZETT): 451 MS
QTC FREDERICIA: 414 MS
R AXIS: 25 DEGREES
T AXIS: 43 DEGREES
T OFFSET: 392 MS
VENTRICULAR RATE: 101 BPM

## 2024-04-24 PROCEDURE — 76830 TRANSVAGINAL US NON-OB: CPT | Performed by: RADIOLOGY

## 2024-04-24 PROCEDURE — 76856 US EXAM PELVIC COMPLETE: CPT | Performed by: RADIOLOGY

## 2024-04-24 RX ORDER — DICYCLOMINE HYDROCHLORIDE 10 MG/ML
20 INJECTION INTRAMUSCULAR ONCE
Status: DISCONTINUED | OUTPATIENT
Start: 2024-04-24 | End: 2024-04-24 | Stop reason: HOSPADM

## 2024-04-24 RX ORDER — DICYCLOMINE HYDROCHLORIDE 20 MG/1
20 TABLET ORAL
Qty: 30 TABLET | Refills: 0 | Status: SHIPPED | OUTPATIENT
Start: 2024-04-24 | End: 2024-04-26 | Stop reason: WASHOUT

## 2024-04-24 RX ORDER — IBUPROFEN 600 MG/1
TABLET ORAL
COMMUNITY
End: 2024-04-26 | Stop reason: ALTCHOICE

## 2024-04-24 NOTE — PROGRESS NOTES
Emergency Medicine Transition of Care Note.    I received Tamra Tai in signout from Dr. Forbes.  Please see the previous ED provider note for all HPI, PE and MDM up to the time of signout. This is in addition to the primary record.    In brief Tamra Tai is an 38 y.o. female presenting for   Chief Complaint   Patient presents with    Diarrhea     At the time of signout we were awaiting: CT    Diagnoses as of 04/24/24 0420   Diarrhea, unspecified type   LUQ abdominal pain       Medical Decision Making  CT scan shows left ovarian cyst 4 cm.  Her pain is left-sided but more upper rather than lower.  The patient is agreeable to ultrasound.  This showed no obvious torsion with good blood flow to both adnexa.  The patient's feels marginally improved.  She was initially considering staying for pain control.  However in the absence of any surgical disease do not feel she needs any IV pain medications.  The patient was unable to produce any stool sample here.  She did take several Imodium yesterday.  Feels this may have improved her diarrhea but now worsens her pain with more cramping.    Final diagnoses:   [R19.7] Diarrhea, unspecified type   [R10.12] LUQ abdominal pain           Procedure  Procedures    Isaac Villalobos MD

## 2024-04-26 ENCOUNTER — SPECIALTY PHARMACY (OUTPATIENT)
Dept: PHARMACY | Facility: CLINIC | Age: 39
End: 2024-04-26

## 2024-04-26 ENCOUNTER — OFFICE VISIT (OUTPATIENT)
Dept: GASTROENTEROLOGY | Facility: CLINIC | Age: 39
End: 2024-04-26
Payer: COMMERCIAL

## 2024-04-26 DIAGNOSIS — R04.0 FREQUENT NOSEBLEEDS: ICD-10-CM

## 2024-04-26 DIAGNOSIS — K58.0 IRRITABLE BOWEL SYNDROME WITH DIARRHEA: ICD-10-CM

## 2024-04-26 DIAGNOSIS — R11.0 NAUSEA: ICD-10-CM

## 2024-04-26 DIAGNOSIS — K21.00 GASTROESOPHAGEAL REFLUX DISEASE WITH ESOPHAGITIS, UNSPECIFIED WHETHER HEMORRHAGE: ICD-10-CM

## 2024-04-26 DIAGNOSIS — R19.7 DIARRHEA, UNSPECIFIED TYPE: Primary | ICD-10-CM

## 2024-04-26 DIAGNOSIS — R10.10 PAIN OF UPPER ABDOMEN: ICD-10-CM

## 2024-04-26 PROCEDURE — 99204 OFFICE O/P NEW MOD 45 MIN: CPT

## 2024-04-26 PROCEDURE — RXMED WILLOW AMBULATORY MEDICATION CHARGE

## 2024-04-26 PROCEDURE — 3008F BODY MASS INDEX DOCD: CPT

## 2024-04-26 RX ORDER — DICYCLOMINE HYDROCHLORIDE 10 MG/1
10 CAPSULE ORAL 4 TIMES DAILY PRN
Qty: 45 CAPSULE | Refills: 0 | Status: SHIPPED | OUTPATIENT
Start: 2024-04-26 | End: 2024-05-26

## 2024-04-26 RX ORDER — FAMOTIDINE 40 MG/1
40 TABLET, FILM COATED ORAL DAILY
Qty: 30 TABLET | Refills: 0 | Status: SHIPPED | OUTPATIENT
Start: 2024-04-26 | End: 2024-05-10

## 2024-04-26 ASSESSMENT — ENCOUNTER SYMPTOMS
ABDOMINAL DISTENTION: 1
FEVER: 1
VOMITING: 0
APPETITE CHANGE: 0
DIARRHEA: 1
ANAL BLEEDING: 0
SHORTNESS OF BREATH: 0
RECTAL PAIN: 0
BLOOD IN STOOL: 0
ABDOMINAL PAIN: 1
FATIGUE: 0
CONSTIPATION: 0
NAUSEA: 1
TROUBLE SWALLOWING: 0
CHILLS: 0
COUGH: 0

## 2024-04-26 NOTE — PROGRESS NOTES
Subjective     History of Present Illness:   Tamra Tai is a 38 y.o. female with PMHx of HTN, Cushing syndrome, dysphagia, impaired cognition, GERD, obesity who presents to GI clinic for further evaluation enlarged liver/spleen    4/2024 CT scan for LLQ pain and diarrhea: Mild ileus related to enteritis, hepatosplenomegaly, left ovarian cyst    Today, patient went to ED for 4 days of diarrhea, fever >103 and stomach pain.  Pain is in upper abdomen underneath ribs.  Has had 9 episodes of stomach bug over the past year with same symptoms plus vomiting.  Every time she eats steak, she gets sick, orders medium rare.  States no one else around her gets sick when she does.  Has constant loose diarrhea at least 20 times daily, violent vomiting, body aches that last several days.  Tried imodium, which slightly helped after 2 dose.  Has tried regular fiber every single day which did not help.  Has chronic GERD and reports having EGD and colonoscopy in 2018.  Takes 20 mg omeprazole daily.  Normally has loose/stools approximately 3 times daily.  Feels bloated normally.  Has frequent nose bleeds lasting 30 minutes.  Has allergies.  Denies constipation, melena, hematochezia, dysphagia, unintentional weight loss    Social ETOH, denies smoking and marijuana  Denies fxh GI cancer or IBD  Abdominal Surgeries: cholecystectomy    Last colonoscopy- reports was in [facility] ? [facility]- tortuous bowel  Last EGD ? 2018- gastritis, esophageal stricture    Past Medical History  As per HPI.     Social History  she  reports current alcohol use of about 2.0 standard drinks of alcohol per week. She reports that she does not use drugs.     Family History  her family history includes Anxiety disorder in her mother; Clotting disorder in her brother; Heart disease in her father; Hyperlipidemia in her father; Hypertension in her brother, brother, father, mother, and another family member; Pancreatic cancer in an other family member; Psoriasis in  her father; Sarcoidosis in her mother; Thyroid cancer in an other family member; Thyroid disease in an other family member.     Review of Systems  Review of Systems   Constitutional:  Positive for fever. Negative for appetite change, chills and fatigue.   HENT:  Positive for nosebleeds. Negative for trouble swallowing.    Respiratory:  Negative for cough and shortness of breath.    Gastrointestinal:  Positive for abdominal distention, abdominal pain (upper), diarrhea and nausea. Negative for anal bleeding, blood in stool, constipation, rectal pain and vomiting.       Allergies  No Known Allergies    Medications  Current Outpatient Medications   Medication Instructions    acetaZOLAMIDE (DIAMOX) 500 mg, oral, 2 times daily    dexAMETHasone (DECADRON) 1 mg, oral, Once, Take 1mg at bedtime, then go to the lab the next morning    dicyclomine (BENTYL) 10 mg, oral, 4 times daily PRN    famotidine (PEPCID) 40 mg, oral, Daily    ibuprofen 600 mg tablet oral    levonorgestrel (Mirena) 21 mcg/24 hours (8 yrs) 52 mg IUD intrauterine    lisinopril 30 mg, oral, Daily    loratadine (Claritin) 5 mg/5 mL syrup 1 tablet, oral, As needed    multivit-min/ferrous fumarate (MULTI VITAMIN ORAL) oral    omeprazole OTC (PriLOSEC OTC) 20 mg EC tablet oral    rifAXIMin (XIFAXAN) 550 mg, oral, 3 times daily    sertraline (ZOLOFT) 50 mg, oral, Daily        Objective   There were no vitals taken for this visit.   Physical Exam  Constitutional:       Appearance: Normal appearance. She is obese.   HENT:      Mouth/Throat:      Mouth: Mucous membranes are dry.      Pharynx: Oropharynx is clear.   Cardiovascular:      Rate and Rhythm: Normal rate and regular rhythm.   Pulmonary:      Effort: Pulmonary effort is normal.      Breath sounds: Normal breath sounds. No wheezing or rhonchi.   Abdominal:      General: Abdomen is flat. Bowel sounds are normal. There is distension.      Palpations: Abdomen is soft. There is no hepatomegaly.      Tenderness:  There is abdominal tenderness (upper abd). There is no guarding or rebound. Negative signs include Davison's sign.      Hernia: No hernia is present.   Musculoskeletal:         General: Normal range of motion.   Skin:     General: Skin is warm and dry.   Neurological:      General: No focal deficit present.      Mental Status: She is alert and oriented to person, place, and time.   Psychiatric:         Mood and Affect: Mood normal.         Behavior: Behavior normal.           Lab Results   Component Value Date    WBC 4.6 04/23/2024    WBC 8.4 12/15/2023    WBC 7.6 12/16/2021    HGB 12.2 04/23/2024    HGB 11.8 (L) 12/15/2023    HGB 12.0 12/16/2021    HCT 39.2 04/23/2024    HCT 37.4 12/15/2023    HCT 37.6 12/16/2021     04/23/2024     12/15/2023     12/16/2021     Lab Results   Component Value Date     04/23/2024     06/16/2022     12/16/2021    K 3.8 04/23/2024    K 4.2 06/16/2022    K 4.3 12/16/2021     04/23/2024     06/16/2022     12/16/2021    CO2 23 04/23/2024    CO2 22 (L) 06/16/2022    CO2 22 (L) 12/16/2021    BUN 8 04/23/2024    BUN 12 06/16/2022    BUN 9 12/16/2021    CREATININE 0.66 04/23/2024    CREATININE 0.64 12/15/2023    CREATININE 0.7 06/16/2022    CALCIUM 8.4 (L) 04/23/2024    CALCIUM 9.4 06/16/2022    CALCIUM 9.3 12/16/2021    PROT 7.0 04/23/2024    PROT 7.2 06/16/2022    PROT 6.6 12/16/2021    BILITOT 0.3 04/23/2024    BILITOT 0.2 06/16/2022    BILITOT 0.2 12/16/2021    ALKPHOS 90 04/23/2024    ALKPHOS 77 06/16/2022    ALKPHOS 82 12/16/2021    ALT 52 (H) 04/23/2024    ALT 25 06/16/2022    ALT 21 12/16/2021    AST 34 04/23/2024    AST 17 06/16/2022    AST 16 12/16/2021    GLUCOSE 92 04/23/2024    GLUCOSE 89 06/16/2022    GLUCOSE 103 (H) 12/16/2021           Tamra Tai is a 38 y.o. female who presents to GI clinic for diarrhea, n/v, abd pain.    Frequent nosebleeds  -Recommended following up with PCP.  Consider ENT referral    Irritable  bowel syndrome with diarrhea  Acute on chronic diarrhea with underlying IBS-D  -Start Xifaxan following treatment of acute diarrhea    Diarrhea  Most likely gastroenteritis, infectious stool.  Acute on chronic diarrhea  -Stool PCR, ova parasite, C. difficile ordered  -As needed Bentyl for abdominal pain    Follow-up 1 to 2 months    Pain of upper abdomen  Chronic GERD versus gastroenteritis.  Reported history of gastritis and esophageal stricture  -Add 40 mg Pepcid daily to regular 20 mg daily omeprazole consider EGD         Kimberley Valdez, APRN-CNP

## 2024-04-26 NOTE — PATIENT INSTRUCTIONS
Please get stool tests and I will notify you of results.  We will order a gut specific antibiotic either right away if your stool tests are negative or following treatment if they are positive    For 30 days take daily pepcid I prescribed to calm stomach acid and as needed bentyl for pain.  These should help with symptoms    You may consider a follow up ultrasound with your PCP for enlarged organs, but these are likely from infection    Follow up 1-2 months.  We may consider an EGD    Please start Xifaxan.  This is a gut specific antibiotic.  Take 3 times daily for 14 days.  You may repeat if partial response.  Xifaxan is typically a costly drug and may not be covered by many insurance companies.  If you have Medicare or Medicaid, you may qualify for EXTRA HELP/LIS BENEFITS.  To apply, you may go online at www.socialsecurity.gov/extrahelp, call your local social security or call [phone], or apply in person at your local social security office.  You may also contact our office to see if you qualify for a patient assistance program by [facility], which requires an application.

## 2024-04-26 NOTE — ASSESSMENT & PLAN NOTE
Acute on chronic diarrhea with underlying IBS-D  -Start Xifaxan following treatment of acute diarrhea

## 2024-04-26 NOTE — ASSESSMENT & PLAN NOTE
Chronic GERD versus gastroenteritis.  Reported history of gastritis and esophageal stricture  -Add 40 mg Pepcid daily to regular 20 mg daily omeprazole consider EGD

## 2024-04-26 NOTE — ASSESSMENT & PLAN NOTE
Most likely gastroenteritis, infectious stool.  Acute on chronic diarrhea  -Stool PCR, ova parasite, C. difficile ordered  -As needed Bentyl for abdominal pain    Follow-up 1 to 2 months

## 2024-04-30 ENCOUNTER — APPOINTMENT (OUTPATIENT)
Dept: PRIMARY CARE | Facility: CLINIC | Age: 39
End: 2024-04-30
Payer: COMMERCIAL

## 2024-05-10 ENCOUNTER — SPECIALTY PHARMACY (OUTPATIENT)
Dept: PHARMACY | Facility: CLINIC | Age: 39
End: 2024-05-10

## 2024-05-10 ENCOUNTER — PHARMACY VISIT (OUTPATIENT)
Dept: PHARMACY | Facility: CLINIC | Age: 39
End: 2024-05-10
Payer: COMMERCIAL

## 2024-05-10 DIAGNOSIS — R10.10 PAIN OF UPPER ABDOMEN: ICD-10-CM

## 2024-05-10 DIAGNOSIS — R11.0 NAUSEA: ICD-10-CM

## 2024-05-10 RX ORDER — FAMOTIDINE 40 MG/1
40 TABLET, FILM COATED ORAL DAILY
Qty: 90 TABLET | Refills: 0 | Status: SHIPPED | OUTPATIENT
Start: 2024-05-10 | End: 2024-08-08

## 2024-05-29 ENCOUNTER — SPECIALTY PHARMACY (OUTPATIENT)
Dept: PHARMACY | Facility: CLINIC | Age: 39
End: 2024-05-29

## 2024-05-30 ENCOUNTER — SPECIALTY PHARMACY (OUTPATIENT)
Dept: PHARMACY | Facility: CLINIC | Age: 39
End: 2024-05-30

## 2024-06-05 ENCOUNTER — SPECIALTY PHARMACY (OUTPATIENT)
Dept: PHARMACY | Facility: CLINIC | Age: 39
End: 2024-06-05

## 2024-06-12 DIAGNOSIS — I10 ESSENTIAL HYPERTENSION: ICD-10-CM

## 2024-06-12 RX ORDER — LISINOPRIL 30 MG/1
30 TABLET ORAL DAILY
Qty: 30 TABLET | Refills: 6 | Status: SHIPPED | OUTPATIENT
Start: 2024-06-12 | End: 2025-06-12

## 2024-06-13 ENCOUNTER — PHARMACY VISIT (OUTPATIENT)
Dept: PHARMACY | Facility: CLINIC | Age: 39
End: 2024-06-13
Payer: COMMERCIAL

## 2024-06-13 PROCEDURE — RXMED WILLOW AMBULATORY MEDICATION CHARGE

## 2024-07-12 ENCOUNTER — SPECIALTY PHARMACY (OUTPATIENT)
Dept: PHARMACY | Facility: CLINIC | Age: 39
End: 2024-07-12

## 2024-07-15 ENCOUNTER — APPOINTMENT (OUTPATIENT)
Dept: ENDOCRINOLOGY | Facility: CLINIC | Age: 39
End: 2024-07-15
Payer: COMMERCIAL

## 2024-07-15 VITALS
SYSTOLIC BLOOD PRESSURE: 122 MMHG | HEIGHT: 64 IN | DIASTOLIC BLOOD PRESSURE: 94 MMHG | HEART RATE: 100 BPM | BODY MASS INDEX: 50.02 KG/M2 | WEIGHT: 293 LBS

## 2024-07-15 DIAGNOSIS — G43.809 OTHER MIGRAINE WITHOUT STATUS MIGRAINOSUS, NOT INTRACTABLE: ICD-10-CM

## 2024-07-15 DIAGNOSIS — R63.5 WEIGHT GAIN: Primary | ICD-10-CM

## 2024-07-15 DIAGNOSIS — E24.9 CUSHING'S SYNDROME, UNSPECIFIED (MULTI): ICD-10-CM

## 2024-07-15 DIAGNOSIS — E04.1 THYROID NODULE: ICD-10-CM

## 2024-07-15 DIAGNOSIS — E66.01 CLASS 3 SEVERE OBESITY WITH SERIOUS COMORBIDITY AND BODY MASS INDEX (BMI) OF 50.0 TO 59.9 IN ADULT, UNSPECIFIED OBESITY TYPE (MULTI): ICD-10-CM

## 2024-07-15 PROCEDURE — 3008F BODY MASS INDEX DOCD: CPT | Performed by: STUDENT IN AN ORGANIZED HEALTH CARE EDUCATION/TRAINING PROGRAM

## 2024-07-15 PROCEDURE — 3080F DIAST BP >= 90 MM HG: CPT | Performed by: STUDENT IN AN ORGANIZED HEALTH CARE EDUCATION/TRAINING PROGRAM

## 2024-07-15 PROCEDURE — 99215 OFFICE O/P EST HI 40 MIN: CPT | Performed by: STUDENT IN AN ORGANIZED HEALTH CARE EDUCATION/TRAINING PROGRAM

## 2024-07-15 PROCEDURE — 3074F SYST BP LT 130 MM HG: CPT | Performed by: STUDENT IN AN ORGANIZED HEALTH CARE EDUCATION/TRAINING PROGRAM

## 2024-07-15 RX ORDER — TIRZEPATIDE 7.5 MG/.5ML
7.5 INJECTION, SOLUTION SUBCUTANEOUS
Qty: 4 EACH | Refills: 3 | Status: SHIPPED | OUTPATIENT
Start: 2024-07-15

## 2024-07-15 RX ORDER — TOPIRAMATE 50 MG/1
50 TABLET, FILM COATED ORAL 2 TIMES DAILY
Qty: 30 TABLET | Refills: 11 | Status: SHIPPED | OUTPATIENT
Start: 2024-07-15

## 2024-07-15 RX ORDER — DEXAMETHASONE 1 MG/1
1 TABLET ORAL ONCE
Qty: 1 TABLET | Refills: 0 | Status: SHIPPED | OUTPATIENT
Start: 2024-07-15 | End: 2024-07-15

## 2024-07-15 RX ORDER — TIRZEPATIDE 2.5 MG/.5ML
2.5 INJECTION, SOLUTION SUBCUTANEOUS
Qty: 4 EACH | Refills: 0 | Status: SHIPPED | OUTPATIENT
Start: 2024-07-15

## 2024-07-15 RX ORDER — TIRZEPATIDE 5 MG/.5ML
5 INJECTION, SOLUTION SUBCUTANEOUS
Qty: 4 EACH | Refills: 0 | Status: SHIPPED | OUTPATIENT
Start: 2024-07-15

## 2024-07-15 ASSESSMENT — PAIN SCALES - GENERAL: PAINLEVEL: 0-NO PAIN

## 2024-07-15 NOTE — PATIENT INSTRUCTIONS
Zepbound   2.5mg weekly for one month then  5mg weekly for one month then   7.5mg weekly     Wegovy     Topiramate 50mg at bedtime     Dexamethasone suppression test     -take 1mg of dexamethasone around 10pm or your usual bedtime  -the following morning, around 8am you should go to the lab and get your bloodwork done   -this test is best done if you got good sleep at night and you follow your usual night routing     Check your salivary cortisol     Follow up next available     Laura Garcia MD  Divison of Endocrinology   [facility]   Phone: [phone]    option 4, then option 1  Fax: [phone]

## 2024-07-15 NOTE — PROGRESS NOTES
38 F PMH: JENNY on CPAP, HTN, Cholecystecomty, idiopathic intracranial hypertension, HTN    Following up for thyroid and weight    1)  S/p left hemithyroidectomy for an indeterminate thyroid nodule in 2016   Final pathology was reportedly benign     Never on thyroid replacement   Last ultrasound was in 10/2023 showing   Heterogenous right lobe   5mm hypoechoic right interpolar   6mm isoechoic right upper   8mm solid isoechoic right upper     2)  Initially seen by Endo in April 2023 for weight gain   Endo work up:  TFTs A1c wnl fasting glucose wnl   Tpo negative   Dex suppression normal in 4/2023     Steadily gaining since second child was born in 2021      Has gained some more weight since last visit   Complains of physical activity intolerance       GYN:  Has Mirena IUD     Yuliya-doing weight watchers   Doing carb control and calorie restriction     Activity; sedentary has a toddler    No diabetes   Does not get good sleep due to young children at home   No GC exposure    Comorbidities:  JENNY  HTN          Past Medical History:   Diagnosis Date    Cataract     Essential (primary) hypertension 12/11/2019    Hypertension     Family History   Problem Relation Name Age of Onset    Hypertension Mother      Anxiety disorder Mother      Sarcoidosis Mother      Heart disease Father      Hypertension Father      Psoriasis Father      Hyperlipidemia Father      Hypertension Brother      Hypertension Brother      Clotting disorder Brother      Hypertension Other      Thyroid cancer Other      Thyroid disease Other      Pancreatic cancer Other      Glaucoma Neg Hx      Macular degeneration Neg Hx       Social History     Socioeconomic History    Marital status: Single     Spouse name: Not on file    Number of children: 2    Years of education: Not on file    Highest education level: Not on file   Occupational History    Occupation:     Tobacco Use    Smoking status: Not on file     Passive exposure: Never     Smokeless tobacco: Not on file   Vaping Use    Vaping status: Never Used   Substance and Sexual Activity    Alcohol use: Yes     Alcohol/week: 2.0 standard drinks of alcohol     Types: 2 Cans of beer per week     Comment: a month    Drug use: Never    Sexual activity: Not Currently     Partners: Male   Other Topics Concern    Not on file   Social History Narrative    Not on file     Social Determinants of Health     Financial Resource Strain: Not on file   Food Insecurity: Not on file   Transportation Needs: Not on file   Physical Activity: Not on file   Stress: Not on file   Social Connections: Not on file   Intimate Partner Violence: Not on file   Housing Stability: Not on file   Social: works for  as a      Non restorative sleep  Weight gain about 15 lbs over the last 6 lbs   ROS reviewed and is negative except for pertinent findings noted on HPI    Physical Exam  Constitutional:       Comments: Redness on the face   Neck:      Comments: Thyroidectomy scar, has Scfat pad  Abdominal:      Comments: Abdominal obesity    Musculoskeletal:         General: No swelling or tenderness.      Comments: No proximal myopathy   Skin:     General: Skin is warm.      Comments: Violaceous striae in the abdomen   Neurological:      General: No focal deficit present.      Mental Status: She is alert and oriented to person, place, and time.   Psychiatric:         Mood and Affect: Mood normal.         Behavior: Behavior normal.           labs and imaging reviewed, pertinent findings listed on HPI and Impression    Problem List Items Addressed This Visit       Thyroid nodule    Relevant Orders    US thyroid    Weight gain - Primary    Relevant Medications    dexAMETHasone (Decadron) 1 mg tablet    tirzepatide, weight loss, (Zepbound) 2.5 mg/0.5 mL injection    tirzepatide, weight loss, (Zepbound) 5 mg/0.5 mL injection    tirzepatide, weight loss, (Zepbound) 7.5 mg/0.5 mL injection    Other Relevant Orders     Adrenocorticotropic Hormone (ACTH)    Cortisol AM    Dexamethasone     Other Visit Diagnoses       Other migraine without status migrainosus, not intractable        Relevant Medications    topiramate (Topamax) 50 mg tablet    Cushing's syndrome, unspecified (Multi)        Relevant Medications    dexAMETHasone (Decadron) 1 mg tablet    Other Relevant Orders    Adrenocorticotropic Hormone (ACTH)    Cortisol AM    Dexamethasone    Class 3 severe obesity with serious comorbidity and body mass index (BMI) of 50.0 to 59.9 in adult, unspecified obesity type (Multi)        Relevant Medications    tirzepatide, weight loss, (Zepbound) 2.5 mg/0.5 mL injection    tirzepatide, weight loss, (Zepbound) 5 mg/0.5 mL injection    tirzepatide, weight loss, (Zepbound) 7.5 mg/0.5 mL injection            1) history of hemithyroidectomy   -complaining of recent neck fullness, no nodules palpated today, will repeat ultrasound around 10/2024 to reassess     2) Weight gain, obesity  Developed new violaceous striae  -repeat dex suppression, since she does not sleep through the night due to taking care of her toddler will also do late night salivary cortisol   -Discuss risks and side effects of medication, no contraindication to GLP1 RA including: gastroparesis, gallbladder disease, pancreatitis, unstable retinopathy or hx of Medullary thyroid CA  Zepound to titrate to 7.5mg as tolerated   Topiramate 50mg at bedtime    Prefer not to do oral weight loss meds as she also has intracranial HTN, might worsen comorbidity      Follow up next available

## 2024-07-25 DIAGNOSIS — F41.9 ANXIETY: ICD-10-CM

## 2024-07-25 DIAGNOSIS — F32.5 MAJOR DEPRESSIVE DISORDER WITH SINGLE EPISODE, IN FULL REMISSION (CMS-HCC): ICD-10-CM

## 2024-07-25 RX ORDER — SERTRALINE HYDROCHLORIDE 50 MG/1
50 TABLET, FILM COATED ORAL DAILY
Qty: 30 TABLET | Refills: 2 | Status: SHIPPED | OUTPATIENT
Start: 2024-07-25 | End: 2024-11-22

## 2024-08-30 ENCOUNTER — SPECIALTY PHARMACY (OUTPATIENT)
Dept: PHARMACY | Facility: CLINIC | Age: 39
End: 2024-08-30

## 2024-10-01 ENCOUNTER — APPOINTMENT (OUTPATIENT)
Dept: RADIOLOGY | Facility: CLINIC | Age: 39
End: 2024-10-01
Payer: COMMERCIAL

## 2024-10-16 ENCOUNTER — HOSPITAL ENCOUNTER (OUTPATIENT)
Dept: RADIOLOGY | Facility: CLINIC | Age: 39
Discharge: HOME | End: 2024-10-16
Payer: MEDICAID

## 2024-10-16 ENCOUNTER — SPECIALTY PHARMACY (OUTPATIENT)
Dept: PHARMACY | Facility: CLINIC | Age: 39
End: 2024-10-16

## 2024-10-16 DIAGNOSIS — E04.1 THYROID NODULE: ICD-10-CM

## 2024-10-16 PROCEDURE — 76536 US EXAM OF HEAD AND NECK: CPT

## 2024-10-16 PROCEDURE — 76536 US EXAM OF HEAD AND NECK: CPT | Performed by: STUDENT IN AN ORGANIZED HEALTH CARE EDUCATION/TRAINING PROGRAM

## 2024-12-10 DIAGNOSIS — I10 ESSENTIAL HYPERTENSION: ICD-10-CM

## 2024-12-11 RX ORDER — LISINOPRIL 30 MG/1
30 TABLET ORAL DAILY
Qty: 90 TABLET | Refills: 1 | Status: SHIPPED | OUTPATIENT
Start: 2024-12-11

## 2024-12-18 ENCOUNTER — APPOINTMENT (OUTPATIENT)
Dept: ENDOCRINOLOGY | Facility: CLINIC | Age: 39
End: 2024-12-18
Payer: COMMERCIAL

## 2024-12-18 VITALS
BODY MASS INDEX: 50.02 KG/M2 | HEIGHT: 64 IN | WEIGHT: 293 LBS | HEART RATE: 99 BPM | SYSTOLIC BLOOD PRESSURE: 136 MMHG | DIASTOLIC BLOOD PRESSURE: 107 MMHG

## 2024-12-18 DIAGNOSIS — E66.01 CLASS 3 SEVERE OBESITY WITHOUT SERIOUS COMORBIDITY IN ADULT, UNSPECIFIED BMI, UNSPECIFIED OBESITY TYPE: ICD-10-CM

## 2024-12-18 DIAGNOSIS — E66.813 CLASS 3 SEVERE OBESITY WITHOUT SERIOUS COMORBIDITY IN ADULT, UNSPECIFIED BMI, UNSPECIFIED OBESITY TYPE: ICD-10-CM

## 2024-12-18 DIAGNOSIS — E24.9 CUSHING'S SYNDROME: Primary | ICD-10-CM

## 2024-12-18 DIAGNOSIS — E03.9 HYPOTHYROIDISM, UNSPECIFIED TYPE: ICD-10-CM

## 2024-12-18 PROCEDURE — 99214 OFFICE O/P EST MOD 30 MIN: CPT | Performed by: STUDENT IN AN ORGANIZED HEALTH CARE EDUCATION/TRAINING PROGRAM

## 2024-12-18 PROCEDURE — 3080F DIAST BP >= 90 MM HG: CPT | Performed by: STUDENT IN AN ORGANIZED HEALTH CARE EDUCATION/TRAINING PROGRAM

## 2024-12-18 PROCEDURE — 3008F BODY MASS INDEX DOCD: CPT | Performed by: STUDENT IN AN ORGANIZED HEALTH CARE EDUCATION/TRAINING PROGRAM

## 2024-12-18 PROCEDURE — 3075F SYST BP GE 130 - 139MM HG: CPT | Performed by: STUDENT IN AN ORGANIZED HEALTH CARE EDUCATION/TRAINING PROGRAM

## 2024-12-18 RX ORDER — DEXAMETHASONE 1 MG/1
TABLET ORAL
Qty: 1 TABLET | Refills: 0 | Status: SHIPPED | OUTPATIENT
Start: 2024-12-18

## 2024-12-18 RX ORDER — NALTREXONE HYDROCHLORIDE AND BUPROPION HYDROCHLORIDE 8; 90 MG/1; MG/1
TABLET, EXTENDED RELEASE ORAL
Qty: 260 TABLET | Refills: 1 | Status: SHIPPED | OUTPATIENT
Start: 2024-12-18

## 2024-12-18 RX ORDER — VALSARTAN 160 MG/1
160 TABLET ORAL DAILY
COMMUNITY

## 2024-12-18 ASSESSMENT — PAIN SCALES - GENERAL: PAINLEVEL_OUTOF10: 2

## 2024-12-18 NOTE — PROGRESS NOTES
39 F PMH: JENNY on CPAP, HTN, Cholecystecomty, Idiopathic Intracranial hypertension     Following up for Thyroid and Weight management     1) Thyroid Background History:   S/p left hemithyroidectomy for an indeterminate thyroid nodule in 2016   Final pathology was reportedly benign      Never on thyroid replacement   Previous Thyroid US 10/2023 showing   Heterogenous right lobe   5mm hypoechoic right interpolar   6mm isoechoic right upper   8mm solid isoechoic right upper   Last Thyroid US 10/16/24 showing Stable multiple sub centimeter right sided spongiform nodules. Prior left Thyroidectomy with no gross soft tissue mass at the surgical bed.      2)  Initially seen by Endo in April 2023 for weight gain   Endo work up:  TFTs A1c wnl fasting glucose wnl   Tpo negative   Dex suppression normal in 4/2023   Dexamethasone suppression repeated in 6/2024     Steadily gaining since second child was born in 2021.   Started on Zepound July through October 2024. Discontinued it in the light of social barriers - insurance coverage. Dose was up titrated twice to 7.5 mg/wk - self administered on Sundays.   Weight loss of 5 pounds while on it.   Did however endorse nausea/ vomiting at the time. Noted appetite suppression  especially on the ultimate dose. Yuliya-doing weight watchers. Doing carb control and calorie restriction.  No diabetes. Does not get good sleep due to young children at home. Reports recent angioedema, lisinopril switched to valsartan by primary provider - received GC (1*). Previous Medications for weight loss: Topiramate 50 mg orally nightly - discontinued in the light of excessive sleepiness/bedwetting.       GYN:  Has Mirena IUD      Activity; sedentary has a toddler    Comorbidities:  JENNY  HTN    Physical Exam:   Vitals:    12/18/24 1016   BP: (!) 136/107   Pulse: 99    Constitutional:       Comments: Redness on the face   Neck:      Comments: Thyroidectomy scar, has Scfat pad  Abdominal:      Comments:  Abdominal obesity    Musculoskeletal:         General: No swelling or tenderness.      Comments: No proximal myopathy   Skin:     General: Skin is warm.      Comments: Lower abdom. Violaceous Striae - fairly recent.   Neurological:      General: No focal deficit present.      Mental Status: She is alert and oriented to person, place, and time.   Psychiatric:         Mood and Affect: Mood normal.         Behavior: Behavior normal.     Pertinent Lab work:     Last A1C 3/12/24: 5.2   Last TSH on 3/12/24:  1.97   Fasting Glucose on 3/12/24: 97      Pertinent  Medications:    Current Outpatient Medications:     acetaZOLAMIDE (Diamox) 500 mg 12 hr capsule, Take 1 capsule (500 mg) by mouth 2 times a day., Disp: 180 capsule, Rfl: 3    levonorgestrel (Mirena) 21 mcg/24 hours (8 yrs) 52 mg IUD, by intrauterine route., Disp: , Rfl:     loratadine (Claritin) 5 mg/5 mL syrup, Take 1 tablet by mouth if needed., Disp: , Rfl:     multivit-min/ferrous fumarate (MULTI VITAMIN ORAL), Take by mouth., Disp: , Rfl:     omeprazole OTC (PriLOSEC OTC) 20 mg EC tablet, Take by mouth., Disp: , Rfl:     valsartan (Diovan) 160 mg tablet, Take 1 tablet (160 mg) by mouth once daily., Disp: , Rfl:     dexAMETHasone (Decadron) 1 mg tablet, Take 1 tablet (1 mg) by mouth 1 time for 1 dose. Take 1mg at bedtime, then go to the lab the next morning, Disp: 1 tablet, Rfl: 0    famotidine (Pepcid) 40 mg tablet, Take 1 tablet (40 mg) by mouth once daily., Disp: 90 tablet, Rfl: 0    lisinopril 30 mg tablet, TAKE 1 TABLET BY MOUTH EVERY DAY, Disp: 90 tablet, Rfl: 1    sertraline (Zoloft) 50 mg tablet, Take 1 tablet (50 mg) by mouth once daily., Disp: 30 tablet, Rfl: 2    tirzepatide, weight loss, (Zepbound) 2.5 mg/0.5 mL injection, Inject 2.5 mg under the skin every 7 days. (Patient not taking: Reported on 12/18/2024), Disp: 4 each, Rfl: 0    tirzepatide, weight loss, (Zepbound) 5 mg/0.5 mL injection, Inject 5 mg under the skin every 7 days. (Patient not  taking: Reported on 12/18/2024), Disp: 4 each, Rfl: 0    tirzepatide, weight loss, (Zepbound) 7.5 mg/0.5 mL injection, Inject 7.5 mg under the skin every 7 days. (Patient not taking: Reported on 12/18/2024), Disp: 4 each, Rfl: 3    topiramate (Topamax) 50 mg tablet, Take 1 tablet (50 mg) by mouth 2 times a day. (Patient not taking: Reported on 12/18/2024), Disp: 30 tablet, Rfl: 11    Current Facility-Administered Medications:     gadoterate meglumine (Dotarem) 0.5 mmol/mL contrast injection 26 mL, 26 mL, intravenous, Once in imaging, Feliciano Mckenzie MD PhD        Assessment/Plan:   1) History of Hemithyroidectomy - Left   Today - 12/18/24: Ordered TSH/Free T4    2) Weight Management       Clinical Features of Cushing s   - For the new violaceous striae - repeat Dexa suppression test ordered. Unclear why previously ordered panel in 6/24 not resulted. Patient reports taking the pill.     - Today - 12/18/24 - Ordered Contrave - to be up titration as indicated. Patient is also to sign up for Contrave Website. Prescription sent over to designated pharmacy. Recommending she follows with Dr. Ray - weight management clinic.     RTC in 4 -6 months.  Patient is discussed, seen, and examined with Dr. Garcia.

## 2024-12-18 NOTE — PATIENT INSTRUCTIONS
Dr. Fitzpatrick-weight loss management     Go sign up for contrave website   Initiation: 1 tablet qAM days 1-7, then 1 tab BID days 8-14, then 2 tabs in AM + 1 tab in PM days 15-22, then 2 tabs BID     Laura Garcia MD  Divison of Endocrinology   [facility]   Phone: [phone]    option 4, then option 1  Fax: [phone]

## 2024-12-21 ENCOUNTER — LAB (OUTPATIENT)
Dept: LAB | Facility: LAB | Age: 39
End: 2024-12-21
Payer: COMMERCIAL

## 2024-12-21 DIAGNOSIS — E03.9 HYPOTHYROIDISM, UNSPECIFIED: Primary | ICD-10-CM

## 2024-12-21 DIAGNOSIS — E24.9 CUSHING'S SYNDROME, UNSPECIFIED: ICD-10-CM

## 2024-12-21 LAB
CORTIS AM PEAK SERPL-MSCNC: 0.6 UG/DL (ref 5–20)
T4 FREE SERPL-MCNC: 1.13 NG/DL (ref 0.78–1.48)
TSH SERPL-ACNC: 1 MIU/L (ref 0.44–3.98)

## 2024-12-21 PROCEDURE — 84439 ASSAY OF FREE THYROXINE: CPT

## 2024-12-21 PROCEDURE — 84443 ASSAY THYROID STIM HORMONE: CPT

## 2024-12-21 PROCEDURE — 82533 TOTAL CORTISOL: CPT

## 2024-12-23 LAB — ACTH PLAS-MCNC: <1.5 PG/ML (ref 7.2–63.3)

## 2024-12-23 NOTE — RESULT ENCOUNTER NOTE
Dex suppression test showing appropriate cortisol suppression.  Secondary causes ruled out, no need for urine or salivary testing

## 2024-12-25 LAB — DEXAMETHASONE SERPL-MCNC: 430.4 NG/DL

## 2024-12-30 DIAGNOSIS — E28.2 PCOS (POLYCYSTIC OVARIAN SYNDROME): Primary | ICD-10-CM

## 2025-03-10 ENCOUNTER — ANCILLARY PROCEDURE (OUTPATIENT)
Dept: URGENT CARE | Age: 40
End: 2025-03-10
Payer: MEDICAID

## 2025-03-10 ENCOUNTER — OFFICE VISIT (OUTPATIENT)
Dept: URGENT CARE | Age: 40
End: 2025-03-10
Payer: MEDICAID

## 2025-03-10 VITALS
BODY MASS INDEX: 49.17 KG/M2 | TEMPERATURE: 97.8 F | RESPIRATION RATE: 20 BRPM | DIASTOLIC BLOOD PRESSURE: 60 MMHG | WEIGHT: 288 LBS | HEART RATE: 103 BPM | HEIGHT: 64 IN | SYSTOLIC BLOOD PRESSURE: 117 MMHG | OXYGEN SATURATION: 97 %

## 2025-03-10 DIAGNOSIS — R68.89 FLU-LIKE SYMPTOMS: ICD-10-CM

## 2025-03-10 DIAGNOSIS — J18.9 PNEUMONIA OF LEFT LUNG DUE TO INFECTIOUS ORGANISM, UNSPECIFIED PART OF LUNG: Primary | ICD-10-CM

## 2025-03-10 LAB
POC RAPID INFLUENZA A: NEGATIVE
POC RAPID INFLUENZA B: NEGATIVE

## 2025-03-10 PROCEDURE — 3078F DIAST BP <80 MM HG: CPT | Performed by: EMERGENCY MEDICINE

## 2025-03-10 PROCEDURE — 71046 X-RAY EXAM CHEST 2 VIEWS: CPT | Performed by: EMERGENCY MEDICINE

## 2025-03-10 PROCEDURE — 87804 INFLUENZA ASSAY W/OPTIC: CPT | Performed by: EMERGENCY MEDICINE

## 2025-03-10 PROCEDURE — 3074F SYST BP LT 130 MM HG: CPT | Performed by: EMERGENCY MEDICINE

## 2025-03-10 PROCEDURE — 3008F BODY MASS INDEX DOCD: CPT | Performed by: EMERGENCY MEDICINE

## 2025-03-10 PROCEDURE — 99204 OFFICE O/P NEW MOD 45 MIN: CPT | Performed by: EMERGENCY MEDICINE

## 2025-03-10 RX ORDER — AZITHROMYCIN 250 MG/1
TABLET, FILM COATED ORAL
Qty: 6 TABLET | Refills: 0 | Status: SHIPPED | OUTPATIENT
Start: 2025-03-10 | End: 2025-03-15

## 2025-03-10 ASSESSMENT — ENCOUNTER SYMPTOMS
SHORTNESS OF BREATH: 0
MYALGIAS: 1
SINUS PAIN: 0
COUGH: 1
SORE THROAT: 0
COLOR CHANGE: 0
FEVER: 1
CHILLS: 1
BACK PAIN: 0

## 2025-03-10 ASSESSMENT — COPD QUESTIONNAIRES: COPD: 0

## 2025-03-10 NOTE — LETTER
March 10, 2025     Patient: Tamra Tai   YOB: 1985   Date of Visit: 3/10/2025       To Whom It May Concern:    Tamra Tai was seen in my clinic on 3/10/2025 at 11:00 am. Please excuse Tamra for her absence from work on this day to make the appointment.           Sincerely,         Mckenzie Lewis MD        CC: No Recipients

## 2025-03-10 NOTE — PATIENT INSTRUCTIONS
Complete antibiotics. Call primary doctor to update on new condition and plan of treatment.   Go to the ER for any shortness of breath or worsening of your symptoms

## 2025-03-10 NOTE — LETTER
March 10, 2025     Patient: Tamra Tai   YOB: 1985   Date of Visit: 3/10/2025       To Whom It May Concern:    Tamra Tai was seen in my clinic on 3/10/2025 at 11:00 am. Please excuse Tamra for her absence from work on this day to make the appointment. Tamra Tai will need to be out of work on 3/10/25 and 3/11/25. Tamra Tai may return to work on 3/12/25.    If you have any questions or concerns, please don't hesitate to call.         Sincerely,         Mckenzie Lewis MD        CC: No Recipients

## 2025-03-10 NOTE — Clinical Note
March 10, 2025     Patient: Tamra Tai   YOB: 1985   Date of Visit: 3/10/2025       To Whom It May Concern:    Tamra Tai was seen in my clinic on 3/10/2025 at 11:00 am. Please excuse Tamra for her absence from work on this day to make the appointment.    If you have any questions or concerns, please don't hesitate to call.         Sincerely,         Mckenzie Lewis MD        CC: No Recipients

## 2025-03-10 NOTE — PROGRESS NOTES
Subjective   Patient ID: Tamra Tai is a 39 y.o. female. They present today with a chief complaint of Cough (Fever 102-104.5 began Friday, fatigue, cough. Negative home Covid & Flu tests on Friday.).    History of Present Illness  39-year-old female with no significant past medical history.  Patient complaining of cough and fever x 3 days.  She has positive sick contacts with similar symptoms that are improving.  Patient has been using over-the-counter medications with good effect.      History provided by:  Patient   used: No    Cough  This is a new problem. The current episode started in the past 7 days. The problem has been gradually improving. Associated symptoms include chills, a fever and myalgias. Pertinent negatives include no chest pain, sore throat or shortness of breath. The treatment provided moderate relief. There is no history of asthma or COPD.       Past Medical History  Allergies as of 03/10/2025 - Reviewed 03/10/2025   Allergen Reaction Noted    Lisinopril Swelling 12/10/2024       (Not in a hospital admission)       Past Medical History:   Diagnosis Date    Cataract     Essential (primary) hypertension 12/11/2019    Hypertension       Past Surgical History:   Procedure Laterality Date    OTHER SURGICAL HISTORY  05/25/2017    Thyroid Surgery [facility]-Thyroidectomy Left Lobe        reports that she has never smoked. She has never been exposed to tobacco smoke. She has never used smokeless tobacco. She reports current alcohol use of about 2.0 standard drinks of alcohol per week. She reports that she does not use drugs.    Review of Systems  Review of Systems   Constitutional:  Positive for chills and fever.   HENT:  Positive for congestion. Negative for sinus pain and sore throat.    Respiratory:  Positive for cough. Negative for shortness of breath.    Cardiovascular:  Negative for chest pain.   Musculoskeletal:  Positive for myalgias. Negative for back pain.   Skin:   "Negative for color change.                                  Objective    Vitals:    03/10/25 1052   BP: 117/60   BP Location: Right arm   Patient Position: Sitting   BP Cuff Size: Adult   Pulse: 103   Resp: 20   Temp: 36.6 °C (97.8 °F)   TempSrc: Oral   SpO2: 97%   Weight: 131 kg (288 lb)   Height: 1.626 m (5' 4\")     No LMP recorded. (Menstrual status: IUD).    Physical Exam  Constitutional:       Appearance: Normal appearance.   HENT:      Head: Normocephalic and atraumatic.      Mouth/Throat:      Mouth: Mucous membranes are moist.      Pharynx: No oropharyngeal exudate.   Eyes:      Extraocular Movements: Extraocular movements intact.      Pupils: Pupils are equal, round, and reactive to light.   Cardiovascular:      Rate and Rhythm: Regular rhythm.      Pulses: Normal pulses.   Pulmonary:      Effort: Pulmonary effort is normal. No respiratory distress.      Breath sounds: Wheezing present.   Chest:      Chest wall: No tenderness.   Musculoskeletal:      Cervical back: Neck supple.   Lymphadenopathy:      Cervical: No cervical adenopathy.   Neurological:      General: No focal deficit present.      Mental Status: She is alert and oriented to person, place, and time.      Gait: Gait normal.         Procedures    Point of Care Test & Imaging Results from this visit  Results for orders placed or performed in visit on 03/10/25   POCT Influenza A/B manually resulted   Result Value Ref Range    POC Rapid Influenza A Negative Negative    POC Rapid Influenza B Negative Negative      No results found.    Diagnostic study results (if any) were reviewed by Mckenzie Lewis MD.    Assessment/Plan   Allergies, medications, history, and pertinent labs/EKGs/Imaging reviewed by Mckenzie Lewis MD.     Medical Decision Making  Patient is a 40 y/o F with no significant past medical hx with resp infection. She has negative covid and flu testing.     Orders and Diagnoses  Diagnoses and all orders for this visit:  Flu-like " symptoms  -     POCT Influenza A/B manually resulted  -     XR chest 2 views; Future    === 03/10/25 ===    XR CHEST 2 VIEWS    - Impression -  Left perihilar opacities raising suspicion for pneumonia in the  appropriate clinical setting. Recommend follow-up to ensure  resolution.    Signed by: Sally Dumont 3/10/2025 11:51 AM  Dictation workstation:   [id number]    Medical Admin Record      Patient disposition: { Disposition:44012}    Electronically signed by cMkenzie Lewis MD  11:13 AM

## 2025-04-14 ENCOUNTER — APPOINTMENT (OUTPATIENT)
Dept: ENDOCRINOLOGY | Facility: CLINIC | Age: 40
End: 2025-04-14
Payer: MEDICAID

## 2025-04-15 ENCOUNTER — APPOINTMENT (OUTPATIENT)
Facility: CLINIC | Age: 40
End: 2025-04-15
Payer: MEDICAID

## 2025-04-29 ENCOUNTER — APPOINTMENT (OUTPATIENT)
Dept: ENDOCRINOLOGY | Facility: CLINIC | Age: 40
End: 2025-04-29
Payer: MEDICAID

## 2025-06-19 ENCOUNTER — APPOINTMENT (OUTPATIENT)
Dept: OPHTHALMOLOGY | Facility: CLINIC | Age: 40
End: 2025-06-19
Payer: MEDICAID